# Patient Record
Sex: MALE | Race: WHITE | Employment: OTHER | URBAN - METROPOLITAN AREA
[De-identification: names, ages, dates, MRNs, and addresses within clinical notes are randomized per-mention and may not be internally consistent; named-entity substitution may affect disease eponyms.]

---

## 2018-01-10 ENCOUNTER — ANESTHESIA EVENT (OUTPATIENT)
Dept: ENDOSCOPY | Age: 63
DRG: 417 | End: 2018-01-10
Payer: COMMERCIAL

## 2018-01-10 ENCOUNTER — APPOINTMENT (OUTPATIENT)
Dept: GENERAL RADIOLOGY | Age: 63
DRG: 417 | End: 2018-01-10
Attending: SPECIALIST
Payer: COMMERCIAL

## 2018-01-10 ENCOUNTER — APPOINTMENT (OUTPATIENT)
Dept: CT IMAGING | Age: 63
DRG: 417 | End: 2018-01-10
Attending: SPECIALIST
Payer: COMMERCIAL

## 2018-01-10 ENCOUNTER — APPOINTMENT (OUTPATIENT)
Dept: ULTRASOUND IMAGING | Age: 63
DRG: 417 | End: 2018-01-10
Attending: EMERGENCY MEDICINE
Payer: COMMERCIAL

## 2018-01-10 ENCOUNTER — ANESTHESIA (OUTPATIENT)
Dept: ENDOSCOPY | Age: 63
DRG: 417 | End: 2018-01-10
Payer: COMMERCIAL

## 2018-01-10 ENCOUNTER — HOSPITAL ENCOUNTER (INPATIENT)
Age: 63
LOS: 5 days | Discharge: HOME | DRG: 417 | End: 2018-01-15
Attending: EMERGENCY MEDICINE | Admitting: INTERNAL MEDICINE
Payer: COMMERCIAL

## 2018-01-10 DIAGNOSIS — R17 ELEVATED BILIRUBIN: ICD-10-CM

## 2018-01-10 DIAGNOSIS — K80.43 CHOLEDOCHOLITHIASIS WITH ACUTE CHOLECYSTITIS WITH OBSTRUCTION: ICD-10-CM

## 2018-01-10 DIAGNOSIS — R10.11 RUQ PAIN: Primary | ICD-10-CM

## 2018-01-10 DIAGNOSIS — R79.89 ELEVATED LFTS: ICD-10-CM

## 2018-01-10 PROBLEM — K21.9 GERD (GASTROESOPHAGEAL REFLUX DISEASE): Status: ACTIVE | Noted: 2018-01-10

## 2018-01-10 PROBLEM — Z78.9 ALCOHOL USE: Status: ACTIVE | Noted: 2018-01-10

## 2018-01-10 PROBLEM — E78.5 HYPERLIPIDEMIA: Status: ACTIVE | Noted: 2018-01-10

## 2018-01-10 LAB
ALBUMIN SERPL-MCNC: 4.3 G/DL (ref 3.5–5)
ALBUMIN/GLOB SERPL: 1.5 {RATIO} (ref 1.1–2.2)
ALP SERPL-CCNC: 99 U/L (ref 45–117)
ALT SERPL-CCNC: 270 U/L (ref 12–78)
ANION GAP SERPL CALC-SCNC: 9 MMOL/L (ref 5–15)
APPEARANCE UR: CLEAR
APTT PPP: 24.5 SEC (ref 22.1–32.5)
AST SERPL-CCNC: 299 U/L (ref 15–37)
ATRIAL RATE: 63 BPM
BACTERIA URNS QL MICRO: NEGATIVE /HPF
BASOPHILS # BLD: 0 K/UL (ref 0–0.1)
BASOPHILS NFR BLD: 0 % (ref 0–1)
BILIRUB DIRECT SERPL-MCNC: 4.2 MG/DL (ref 0–0.2)
BILIRUB SERPL-MCNC: 4.6 MG/DL (ref 0.2–1)
BILIRUB UR QL CFM: POSITIVE
BUN SERPL-MCNC: 12 MG/DL (ref 6–20)
BUN/CREAT SERPL: 10 (ref 12–20)
CALCIUM SERPL-MCNC: 9 MG/DL (ref 8.5–10.1)
CALCULATED P AXIS, ECG09: 14 DEGREES
CALCULATED R AXIS, ECG10: 5 DEGREES
CALCULATED T AXIS, ECG11: 41 DEGREES
CHLORIDE SERPL-SCNC: 105 MMOL/L (ref 97–108)
CO2 SERPL-SCNC: 26 MMOL/L (ref 21–32)
COLOR UR: NORMAL
CREAT SERPL-MCNC: 1.2 MG/DL (ref 0.7–1.3)
DIAGNOSIS, 93000: NORMAL
DIFFERENTIAL METHOD BLD: ABNORMAL
EOSINOPHIL # BLD: 0.1 K/UL (ref 0–0.4)
EOSINOPHIL NFR BLD: 2 % (ref 0–7)
EPITH CASTS URNS QL MICRO: NORMAL /LPF
ERYTHROCYTE [DISTWIDTH] IN BLOOD BY AUTOMATED COUNT: 12.4 % (ref 11.5–14.5)
GLOBULIN SER CALC-MCNC: 2.8 G/DL (ref 2–4)
GLUCOSE SERPL-MCNC: 124 MG/DL (ref 65–100)
GLUCOSE UR STRIP.AUTO-MCNC: NEGATIVE MG/DL
HCT VFR BLD AUTO: 46.2 % (ref 36.6–50.3)
HGB BLD-MCNC: 16.4 G/DL (ref 12.1–17)
HGB UR QL STRIP: NEGATIVE
HYALINE CASTS URNS QL MICRO: NORMAL /LPF (ref 0–5)
INR PPP: 1.1 (ref 0.9–1.1)
KETONES UR QL STRIP.AUTO: NEGATIVE MG/DL
LEUKOCYTE ESTERASE UR QL STRIP.AUTO: NEGATIVE
LIPASE SERPL-CCNC: 182 U/L (ref 73–393)
LYMPHOCYTES # BLD: 0.6 K/UL (ref 0.8–3.5)
LYMPHOCYTES NFR BLD: 10 % (ref 12–49)
MCH RBC QN AUTO: 32.2 PG (ref 26–34)
MCHC RBC AUTO-ENTMCNC: 35.5 G/DL (ref 30–36.5)
MCV RBC AUTO: 90.8 FL (ref 80–99)
MONOCYTES # BLD: 0.6 K/UL (ref 0–1)
MONOCYTES NFR BLD: 9 % (ref 5–13)
NEUTS SEG # BLD: 4.9 K/UL (ref 1.8–8)
NEUTS SEG NFR BLD: 79 % (ref 32–75)
NITRITE UR QL STRIP.AUTO: NEGATIVE
P-R INTERVAL, ECG05: 164 MS
PH UR STRIP: 5.5 [PH] (ref 5–8)
PLATELET # BLD AUTO: 151 K/UL (ref 150–400)
POTASSIUM SERPL-SCNC: 4 MMOL/L (ref 3.5–5.1)
PROT SERPL-MCNC: 7.1 G/DL (ref 6.4–8.2)
PROT UR STRIP-MCNC: NEGATIVE MG/DL
PROTHROMBIN TIME: 10.6 SEC (ref 9–11.1)
Q-T INTERVAL, ECG07: 390 MS
QRS DURATION, ECG06: 98 MS
QTC CALCULATION (BEZET), ECG08: 399 MS
RBC # BLD AUTO: 5.09 M/UL (ref 4.1–5.7)
RBC #/AREA URNS HPF: NORMAL /HPF (ref 0–5)
RBC MORPH BLD: ABNORMAL
SODIUM SERPL-SCNC: 140 MMOL/L (ref 136–145)
SP GR UR REFRACTOMETRY: 1.02 (ref 1–1.03)
THERAPEUTIC RANGE,PTTT: NORMAL SECS (ref 58–77)
TROPONIN I SERPL-MCNC: <0.04 NG/ML
UA: UC IF INDICATED,UAUC: NORMAL
UROBILINOGEN UR QL STRIP.AUTO: 1 EU/DL (ref 0.2–1)
VENTRICULAR RATE, ECG03: 63 BPM
WBC # BLD AUTO: 6.2 K/UL (ref 4.1–11.1)
WBC MORPH BLD: ABNORMAL
WBC URNS QL MICRO: NORMAL /HPF (ref 0–4)

## 2018-01-10 PROCEDURE — 77030007288 HC DEV LOK BILI BSC -A: Performed by: SPECIALIST

## 2018-01-10 PROCEDURE — 74011250636 HC RX REV CODE- 250/636

## 2018-01-10 PROCEDURE — 0FC98ZZ EXTIRPATION OF MATTER FROM COMMON BILE DUCT, VIA NATURAL OR ARTIFICIAL OPENING ENDOSCOPIC: ICD-10-PCS | Performed by: SPECIALIST

## 2018-01-10 PROCEDURE — 74011000250 HC RX REV CODE- 250: Performed by: EMERGENCY MEDICINE

## 2018-01-10 PROCEDURE — 81001 URINALYSIS AUTO W/SCOPE: CPT | Performed by: EMERGENCY MEDICINE

## 2018-01-10 PROCEDURE — 96361 HYDRATE IV INFUSION ADD-ON: CPT

## 2018-01-10 PROCEDURE — 93005 ELECTROCARDIOGRAM TRACING: CPT

## 2018-01-10 PROCEDURE — 85025 COMPLETE CBC W/AUTO DIFF WBC: CPT | Performed by: EMERGENCY MEDICINE

## 2018-01-10 PROCEDURE — 77030032490 HC SLV COMPR SCD KNE COVD -B

## 2018-01-10 PROCEDURE — 74177 CT ABD & PELVIS W/CONTRAST: CPT

## 2018-01-10 PROCEDURE — 74011250636 HC RX REV CODE- 250/636: Performed by: SURGERY

## 2018-01-10 PROCEDURE — 74011250636 HC RX REV CODE- 250/636: Performed by: EMERGENCY MEDICINE

## 2018-01-10 PROCEDURE — 74011000258 HC RX REV CODE- 258: Performed by: SURGERY

## 2018-01-10 PROCEDURE — 76060000032 HC ANESTHESIA 0.5 TO 1 HR: Performed by: SPECIALIST

## 2018-01-10 PROCEDURE — 96376 TX/PRO/DX INJ SAME DRUG ADON: CPT

## 2018-01-10 PROCEDURE — 84484 ASSAY OF TROPONIN QUANT: CPT | Performed by: EMERGENCY MEDICINE

## 2018-01-10 PROCEDURE — 65270000029 HC RM PRIVATE

## 2018-01-10 PROCEDURE — 0F798ZZ DILATION OF COMMON BILE DUCT, VIA NATURAL OR ARTIFICIAL OPENING ENDOSCOPIC: ICD-10-PCS | Performed by: SPECIALIST

## 2018-01-10 PROCEDURE — 77030026438 HC STYL ET INTUB CARD -A: Performed by: ANESTHESIOLOGY

## 2018-01-10 PROCEDURE — 80074 ACUTE HEPATITIS PANEL: CPT | Performed by: EMERGENCY MEDICINE

## 2018-01-10 PROCEDURE — 77030008684 HC TU ET CUF COVD -B: Performed by: ANESTHESIOLOGY

## 2018-01-10 PROCEDURE — 77030010104 HC SEAL PRT ENDOSC BYRN -B: Performed by: SPECIALIST

## 2018-01-10 PROCEDURE — 96374 THER/PROPH/DIAG INJ IV PUSH: CPT

## 2018-01-10 PROCEDURE — 85730 THROMBOPLASTIN TIME PARTIAL: CPT | Performed by: EMERGENCY MEDICINE

## 2018-01-10 PROCEDURE — 74011250636 HC RX REV CODE- 250/636: Performed by: SPECIALIST

## 2018-01-10 PROCEDURE — 74011636320 HC RX REV CODE- 636/320: Performed by: SPECIALIST

## 2018-01-10 PROCEDURE — 74011636320 HC RX REV CODE- 636/320: Performed by: RADIOLOGY

## 2018-01-10 PROCEDURE — 85610 PROTHROMBIN TIME: CPT | Performed by: EMERGENCY MEDICINE

## 2018-01-10 PROCEDURE — 83690 ASSAY OF LIPASE: CPT | Performed by: EMERGENCY MEDICINE

## 2018-01-10 PROCEDURE — 82248 BILIRUBIN DIRECT: CPT | Performed by: SPECIALIST

## 2018-01-10 PROCEDURE — 74011250636 HC RX REV CODE- 250/636: Performed by: INTERNAL MEDICINE

## 2018-01-10 PROCEDURE — 77030011640 HC PAD GRND REM COVD -A: Performed by: SPECIALIST

## 2018-01-10 PROCEDURE — 96375 TX/PRO/DX INJ NEW DRUG ADDON: CPT

## 2018-01-10 PROCEDURE — 74011250637 HC RX REV CODE- 250/637: Performed by: INTERNAL MEDICINE

## 2018-01-10 PROCEDURE — 77030009038 HC CATH BILI STN RTVR BSC -C: Performed by: SPECIALIST

## 2018-01-10 PROCEDURE — 74329 X-RAY FOR PANCREAS ENDOSCOPY: CPT

## 2018-01-10 PROCEDURE — 80053 COMPREHEN METABOLIC PANEL: CPT | Performed by: EMERGENCY MEDICINE

## 2018-01-10 PROCEDURE — 77030012596 HC SPHNTOM BILI BSC -E: Performed by: SPECIALIST

## 2018-01-10 PROCEDURE — 74011000250 HC RX REV CODE- 250

## 2018-01-10 PROCEDURE — 76040000007: Performed by: SPECIALIST

## 2018-01-10 PROCEDURE — 36415 COLL VENOUS BLD VENIPUNCTURE: CPT | Performed by: SPECIALIST

## 2018-01-10 PROCEDURE — 76705 ECHO EXAM OF ABDOMEN: CPT

## 2018-01-10 PROCEDURE — 99284 EMERGENCY DEPT VISIT MOD MDM: CPT

## 2018-01-10 RX ORDER — INDOCYANINE GREEN AND WATER 25 MG
5 KIT INJECTION ONCE
Status: CANCELLED | OUTPATIENT
Start: 2018-01-11 | End: 2018-01-11

## 2018-01-10 RX ORDER — FLUMAZENIL 0.1 MG/ML
0.2 INJECTION INTRAVENOUS
Status: DISCONTINUED | OUTPATIENT
Start: 2018-01-10 | End: 2018-01-10 | Stop reason: HOSPADM

## 2018-01-10 RX ORDER — ONDANSETRON 2 MG/ML
8 INJECTION INTRAMUSCULAR; INTRAVENOUS
Status: COMPLETED | OUTPATIENT
Start: 2018-01-10 | End: 2018-01-10

## 2018-01-10 RX ORDER — ONDANSETRON 2 MG/ML
INJECTION INTRAMUSCULAR; INTRAVENOUS AS NEEDED
Status: DISCONTINUED | OUTPATIENT
Start: 2018-01-10 | End: 2018-01-10 | Stop reason: HOSPADM

## 2018-01-10 RX ORDER — FENTANYL CITRATE 50 UG/ML
25 INJECTION, SOLUTION INTRAMUSCULAR; INTRAVENOUS AS NEEDED
Status: DISCONTINUED | OUTPATIENT
Start: 2018-01-10 | End: 2018-01-10 | Stop reason: HOSPADM

## 2018-01-10 RX ORDER — NALOXONE HYDROCHLORIDE 0.4 MG/ML
0.4 INJECTION, SOLUTION INTRAMUSCULAR; INTRAVENOUS; SUBCUTANEOUS AS NEEDED
Status: DISCONTINUED | OUTPATIENT
Start: 2018-01-10 | End: 2018-01-15 | Stop reason: HOSPADM

## 2018-01-10 RX ORDER — HYDROMORPHONE HYDROCHLORIDE 2 MG/ML
0.5 INJECTION, SOLUTION INTRAMUSCULAR; INTRAVENOUS; SUBCUTANEOUS ONCE
Status: COMPLETED | OUTPATIENT
Start: 2018-01-10 | End: 2018-01-10

## 2018-01-10 RX ORDER — FENTANYL CITRATE 50 UG/ML
INJECTION, SOLUTION INTRAMUSCULAR; INTRAVENOUS AS NEEDED
Status: DISCONTINUED | OUTPATIENT
Start: 2018-01-10 | End: 2018-01-10 | Stop reason: HOSPADM

## 2018-01-10 RX ORDER — MIDAZOLAM HYDROCHLORIDE 1 MG/ML
.25-5 INJECTION, SOLUTION INTRAMUSCULAR; INTRAVENOUS AS NEEDED
Status: DISCONTINUED | OUTPATIENT
Start: 2018-01-10 | End: 2018-01-10 | Stop reason: HOSPADM

## 2018-01-10 RX ORDER — HYDROMORPHONE HYDROCHLORIDE 2 MG/ML
0.5 INJECTION, SOLUTION INTRAMUSCULAR; INTRAVENOUS; SUBCUTANEOUS
Status: COMPLETED | OUTPATIENT
Start: 2018-01-10 | End: 2018-01-10

## 2018-01-10 RX ORDER — HYDROMORPHONE HYDROCHLORIDE 2 MG/ML
0.5 INJECTION, SOLUTION INTRAMUSCULAR; INTRAVENOUS; SUBCUTANEOUS
Status: DISCONTINUED | OUTPATIENT
Start: 2018-01-10 | End: 2018-01-11

## 2018-01-10 RX ORDER — PROPOFOL 10 MG/ML
INJECTION, EMULSION INTRAVENOUS AS NEEDED
Status: DISCONTINUED | OUTPATIENT
Start: 2018-01-10 | End: 2018-01-10 | Stop reason: HOSPADM

## 2018-01-10 RX ORDER — MIDAZOLAM HYDROCHLORIDE 1 MG/ML
INJECTION, SOLUTION INTRAMUSCULAR; INTRAVENOUS AS NEEDED
Status: DISCONTINUED | OUTPATIENT
Start: 2018-01-10 | End: 2018-01-10 | Stop reason: HOSPADM

## 2018-01-10 RX ORDER — ROCURONIUM BROMIDE 10 MG/ML
INJECTION, SOLUTION INTRAVENOUS AS NEEDED
Status: DISCONTINUED | OUTPATIENT
Start: 2018-01-10 | End: 2018-01-10 | Stop reason: HOSPADM

## 2018-01-10 RX ORDER — ENOXAPARIN SODIUM 100 MG/ML
40 INJECTION SUBCUTANEOUS DAILY
Status: DISCONTINUED | OUTPATIENT
Start: 2018-01-10 | End: 2018-01-10

## 2018-01-10 RX ORDER — SODIUM CHLORIDE 9 MG/ML
50 INJECTION, SOLUTION INTRAVENOUS CONTINUOUS
Status: DISCONTINUED | OUTPATIENT
Start: 2018-01-10 | End: 2018-01-10

## 2018-01-10 RX ORDER — TADALAFIL 5 MG/1
5 TABLET ORAL DAILY
COMMUNITY

## 2018-01-10 RX ORDER — KETOROLAC TROMETHAMINE 30 MG/ML
15 INJECTION, SOLUTION INTRAMUSCULAR; INTRAVENOUS
Status: COMPLETED | OUTPATIENT
Start: 2018-01-10 | End: 2018-01-10

## 2018-01-10 RX ORDER — LIDOCAINE HYDROCHLORIDE 20 MG/ML
INJECTION, SOLUTION EPIDURAL; INFILTRATION; INTRACAUDAL; PERINEURAL AS NEEDED
Status: DISCONTINUED | OUTPATIENT
Start: 2018-01-10 | End: 2018-01-10 | Stop reason: HOSPADM

## 2018-01-10 RX ORDER — LORAZEPAM 1 MG/1
2 TABLET ORAL
Status: DISCONTINUED | OUTPATIENT
Start: 2018-01-10 | End: 2018-01-15 | Stop reason: HOSPADM

## 2018-01-10 RX ORDER — NALOXONE HYDROCHLORIDE 0.4 MG/ML
0.4 INJECTION, SOLUTION INTRAMUSCULAR; INTRAVENOUS; SUBCUTANEOUS
Status: DISCONTINUED | OUTPATIENT
Start: 2018-01-10 | End: 2018-01-10 | Stop reason: HOSPADM

## 2018-01-10 RX ORDER — DEXTROMETHORPHAN/PSEUDOEPHED 2.5-7.5/.8
1.2 DROPS ORAL
Status: DISCONTINUED | OUTPATIENT
Start: 2018-01-10 | End: 2018-01-10 | Stop reason: HOSPADM

## 2018-01-10 RX ORDER — ONDANSETRON 2 MG/ML
4 INJECTION INTRAMUSCULAR; INTRAVENOUS
Status: DISCONTINUED | OUTPATIENT
Start: 2018-01-10 | End: 2018-01-15 | Stop reason: HOSPADM

## 2018-01-10 RX ORDER — LORAZEPAM 1 MG/1
4 TABLET ORAL
Status: DISCONTINUED | OUTPATIENT
Start: 2018-01-10 | End: 2018-01-15 | Stop reason: HOSPADM

## 2018-01-10 RX ORDER — HYDROCODONE BITARTRATE AND ACETAMINOPHEN 5; 325 MG/1; MG/1
1 TABLET ORAL
Status: DISCONTINUED | OUTPATIENT
Start: 2018-01-10 | End: 2018-01-14

## 2018-01-10 RX ORDER — SODIUM CHLORIDE 0.9 % (FLUSH) 0.9 %
5-10 SYRINGE (ML) INJECTION AS NEEDED
Status: DISCONTINUED | OUTPATIENT
Start: 2018-01-10 | End: 2018-01-15 | Stop reason: HOSPADM

## 2018-01-10 RX ORDER — FAMOTIDINE 10 MG/ML
20 INJECTION INTRAVENOUS
Status: COMPLETED | OUTPATIENT
Start: 2018-01-10 | End: 2018-01-10

## 2018-01-10 RX ORDER — SODIUM CHLORIDE 0.9 % (FLUSH) 0.9 %
5-10 SYRINGE (ML) INJECTION EVERY 8 HOURS
Status: DISCONTINUED | OUTPATIENT
Start: 2018-01-10 | End: 2018-01-15 | Stop reason: HOSPADM

## 2018-01-10 RX ORDER — SODIUM CHLORIDE 9 MG/ML
125 INJECTION, SOLUTION INTRAVENOUS CONTINUOUS
Status: DISPENSED | OUTPATIENT
Start: 2018-01-10 | End: 2018-01-11

## 2018-01-10 RX ORDER — HYDROMORPHONE HYDROCHLORIDE 2 MG/ML
0.5 INJECTION, SOLUTION INTRAMUSCULAR; INTRAVENOUS; SUBCUTANEOUS
Status: DISCONTINUED | OUTPATIENT
Start: 2018-01-10 | End: 2018-01-10

## 2018-01-10 RX ORDER — SUCCINYLCHOLINE CHLORIDE 20 MG/ML
INJECTION INTRAMUSCULAR; INTRAVENOUS AS NEEDED
Status: DISCONTINUED | OUTPATIENT
Start: 2018-01-10 | End: 2018-01-10 | Stop reason: HOSPADM

## 2018-01-10 RX ORDER — OMEPRAZOLE 20 MG/1
20 CAPSULE, DELAYED RELEASE ORAL DAILY
COMMUNITY

## 2018-01-10 RX ORDER — SODIUM CHLORIDE, SODIUM LACTATE, POTASSIUM CHLORIDE, CALCIUM CHLORIDE 600; 310; 30; 20 MG/100ML; MG/100ML; MG/100ML; MG/100ML
50 INJECTION, SOLUTION INTRAVENOUS CONTINUOUS
Status: DISCONTINUED | OUTPATIENT
Start: 2018-01-10 | End: 2018-01-10

## 2018-01-10 RX ADMIN — HYDROCODONE BITARTRATE AND ACETAMINOPHEN 1 TABLET: 5; 325 TABLET ORAL at 17:50

## 2018-01-10 RX ADMIN — FENTANYL CITRATE 25 MCG: 50 INJECTION, SOLUTION INTRAMUSCULAR; INTRAVENOUS at 14:09

## 2018-01-10 RX ADMIN — PROPOFOL 200 MG: 10 INJECTION, EMULSION INTRAVENOUS at 13:54

## 2018-01-10 RX ADMIN — ONDANSETRON 4 MG: 2 INJECTION INTRAMUSCULAR; INTRAVENOUS at 14:13

## 2018-01-10 RX ADMIN — FENTANYL CITRATE 50 MCG: 50 INJECTION, SOLUTION INTRAMUSCULAR; INTRAVENOUS at 13:54

## 2018-01-10 RX ADMIN — LORAZEPAM 2 MG: 1 TABLET ORAL at 22:07

## 2018-01-10 RX ADMIN — PIPERACILLIN SODIUM AND TAZOBACTAM SODIUM 3.38 G: 3; .375 INJECTION, POWDER, LYOPHILIZED, FOR SOLUTION INTRAVENOUS at 12:41

## 2018-01-10 RX ADMIN — ROCURONIUM BROMIDE 5 MG: 10 INJECTION, SOLUTION INTRAVENOUS at 13:54

## 2018-01-10 RX ADMIN — IOPAMIDOL 10 ML: 612 INJECTION, SOLUTION INTRAVENOUS at 14:27

## 2018-01-10 RX ADMIN — PIPERACILLIN SODIUM AND TAZOBACTAM SODIUM 3.38 G: 3; .375 INJECTION, POWDER, LYOPHILIZED, FOR SOLUTION INTRAVENOUS at 20:34

## 2018-01-10 RX ADMIN — MIDAZOLAM HYDROCHLORIDE 2 MG: 1 INJECTION, SOLUTION INTRAMUSCULAR; INTRAVENOUS at 13:51

## 2018-01-10 RX ADMIN — LIDOCAINE HYDROCHLORIDE 40 MG: 20 INJECTION, SOLUTION EPIDURAL; INFILTRATION; INTRACAUDAL; PERINEURAL at 13:54

## 2018-01-10 RX ADMIN — HYDROMORPHONE HYDROCHLORIDE 0.5 MG: 2 INJECTION INTRAMUSCULAR; INTRAVENOUS; SUBCUTANEOUS at 06:18

## 2018-01-10 RX ADMIN — KETOROLAC TROMETHAMINE 15 MG: 30 INJECTION, SOLUTION INTRAMUSCULAR at 06:17

## 2018-01-10 RX ADMIN — HYDROMORPHONE HYDROCHLORIDE 0.5 MG: 2 INJECTION INTRAMUSCULAR; INTRAVENOUS; SUBCUTANEOUS at 21:54

## 2018-01-10 RX ADMIN — HYDROMORPHONE HYDROCHLORIDE 0.5 MG: 2 INJECTION INTRAMUSCULAR; INTRAVENOUS; SUBCUTANEOUS at 09:28

## 2018-01-10 RX ADMIN — SODIUM CHLORIDE 75 ML/HR: 900 INJECTION, SOLUTION INTRAVENOUS at 17:51

## 2018-01-10 RX ADMIN — FAMOTIDINE 20 MG: 10 INJECTION, SOLUTION INTRAVENOUS at 06:21

## 2018-01-10 RX ADMIN — IOPAMIDOL 96 ML: 755 INJECTION, SOLUTION INTRAVENOUS at 10:25

## 2018-01-10 RX ADMIN — LORAZEPAM 2 MG: 1 TABLET ORAL at 23:19

## 2018-01-10 RX ADMIN — HYDROMORPHONE HYDROCHLORIDE 0.5 MG: 2 INJECTION INTRAMUSCULAR; INTRAVENOUS; SUBCUTANEOUS at 07:47

## 2018-01-10 RX ADMIN — ONDANSETRON 4 MG: 2 INJECTION INTRAMUSCULAR; INTRAVENOUS at 20:31

## 2018-01-10 RX ADMIN — FENTANYL CITRATE 25 MCG: 50 INJECTION, SOLUTION INTRAMUSCULAR; INTRAVENOUS at 14:15

## 2018-01-10 RX ADMIN — DIATRIZOATE MEGLUMINE AND DIATRIZOATE SODIUM 30 ML: 660; 100 LIQUID ORAL; RECTAL at 08:50

## 2018-01-10 RX ADMIN — SUCCINYLCHOLINE CHLORIDE 100 MG: 20 INJECTION INTRAMUSCULAR; INTRAVENOUS at 13:54

## 2018-01-10 RX ADMIN — SODIUM CHLORIDE, POTASSIUM CHLORIDE, SODIUM LACTATE AND CALCIUM CHLORIDE: 600; 310; 30; 20 INJECTION, SOLUTION INTRAVENOUS at 13:22

## 2018-01-10 RX ADMIN — SODIUM CHLORIDE 1000 ML: 900 INJECTION, SOLUTION INTRAVENOUS at 06:31

## 2018-01-10 RX ADMIN — ONDANSETRON 8 MG: 2 INJECTION INTRAMUSCULAR; INTRAVENOUS at 06:13

## 2018-01-10 RX ADMIN — HYDROMORPHONE HYDROCHLORIDE 0.5 MG: 2 INJECTION INTRAMUSCULAR; INTRAVENOUS; SUBCUTANEOUS at 12:40

## 2018-01-10 RX ADMIN — HYDROMORPHONE HYDROCHLORIDE 0.5 MG: 2 INJECTION INTRAMUSCULAR; INTRAVENOUS; SUBCUTANEOUS at 18:48

## 2018-01-10 NOTE — PROCEDURES
[address] D. Glorine Councilman, MD  [phone]      January 10, 2018    Endoscopic Retrograde CholangioPancreatography (ERCP) Procedure Note  Lexi Yoon  : 1955  [facility] Medical Record Number: [id number]      Indications:  Abdominal pain and imaging shows choledocholithiasis. PCP:  Vanda Lewis MD  Anesthesia/Sedation: General endotracheal anesthesia  Endoscopist:  Dr. Sherman Maloney  Complications:  None  Estimated Blood Loss:  None     Permit:  The indications, risks, benefits and alternatives were reviewed with the patient or their decision maker who was provided an opportunity to ask questions and all questions were answered. The specific risks of endoscopic retrograde cholangiopancreatography with conscious sedation were reviewed, including but not limited to anesthetic complication, bleeding, adverse drug reaction, missed lesion, infection, IV site reactions, intestinal perforation which would lead to the need for surgical repair, failed cannulation, and post-ERCP pancreatitis. Specific mention was made of the incidence of post-ERCP pancreatitis, estimated at 5% or 1 out of 20. The patient was advised that although most who develop post-ERCP pancreatitis have mild interstitial pancreatitis, some patients can develop severe necrotizing pancreatitis which could lead to the need for prolonged hospitalization, the need for surgery, the need for antibiotics, the need for blood products, or the potential for subsequent sepsis, multiorgan failure, or even death. The alternatives to ERCP including observation without testing, magnetic resonance cholangiopancreatography, surgery, or percutaneous cholangiopancreatography were reviewed as well as the benefits and limitations of each of the above alternatives.   After considering the options and having all their questions answered, the patient or their decision maker provided both verbal and written consent to proceed. Procedure in Detail:  After obtaining informed consent, positioning of the patient prone, and conduction of a pre-procedure pause or \"time out\" the endoscope was introduced into the mouth and advanced to the duodenum to visualize and instrument the ampullary opening. We cannulated the CBD easily. Cholangiography shows small filling defects in distal CBD. The cystic duct fills and the gallbladder accepts contrast.  No leak. We performed endoscopic sphincterotomy in good direction with complete transection. Immediate hemostasis noted. We swapped out for the balloon extraction device. With first sweep we removed debris and 2 small stones and stone fragments. Sweep number 2 with just a small amount of sludge. Then we got dry sweep x2 and a completion cholangiogram revealed no persistent filling defects so the procedure was considered complete. Specimens: none           Recommendations: Choledocholithiasis with endoscopic removal after sphincterotomy. If pain free at 1600 he can have clears, NPO after midnight for gallbladder surgery tomorrow. Thank you for entrusting me with this patient's care. Please do not hesitate to contact me with any questions or if I can be of assistance with any of your other patients' GI needs.   Signed By: Mary Correa MD                        January 10, 2018

## 2018-01-10 NOTE — IP AVS SNAPSHOT
[address] 
[phone] Patient: Bud Polk MRN: [id number] :1955 A check terri indicates which time of day the medication should be taken. My Medications START taking these medications Instructions Each Dose to Equal  
 Morning Noon Evening Bedtime HYDROcodone-acetaminophen 5-325 mg per tablet Commonly known as:  Marva Oh Your last dose was: Your next dose is:    
   
   
 1 to 2 tablets every 4 hours as needed for pain CONTINUE taking these medications Instructions Each Dose to Equal  
 Morning Noon Evening Bedtime  
 aspirin 81 mg chewable tablet Your last dose was: Your next dose is: Take 81 mg by mouth nightly. 81 mg  
    
   
   
   
  
 CIALIS 5 mg tablet Generic drug:  tadalafil Your last dose was: Your next dose is: Take 5 mg by mouth daily. 5 mg  
    
   
   
   
  
 fenofibrate nanocrystallized 145 mg tablet Commonly known as:  [employer] Your last dose was: Your next dose is: Take 145 mg by mouth nightly. Indications: hypertriglyceridemia 145 mg FISH -1,000 mg capsule Generic drug:  fish oil-omega-3 fatty acids Your last dose was: Your next dose is: Take 1 Cap by mouth two (2) times a day. 1 Cap  
    
   
   
   
  
 omeprazole 20 mg capsule Commonly known as:  PRILOSEC Your last dose was: Your next dose is: Take 20 mg by mouth daily. 20 mg  
    
   
   
   
  
 OTHER(NON-FORMULARY) Your last dose was: Your next dose is: BERNA Root : 2 daily ZANTAC 150 mg tablet Generic drug:  raNITIdine Your last dose was: Your next dose is: Take 150 mg by mouth every evening.   
 150 mg  
 Where to Get Your Medications Information on where to get these meds will be given to you by the nurse or doctor. ! Ask your nurse or doctor about these medications HYDROcodone-acetaminophen 5-325 mg per tablet

## 2018-01-10 NOTE — ED TRIAGE NOTES
Complains of sharp stabbing pain in his epigastric area onset 1300 without relief from the over counter meds for antacid, gas ex, ibuprofen. Denies shortness of breath and no nausea.  Pain increases with respiration

## 2018-01-10 NOTE — CONSULTS
Surgery Consult    Subjective:      Johanna Rizzo is a 63 y.o. white male who presents with epigastric and RUQ abdominal pain. Over the past 2 to 3 weeks, Mr. Army Salvador has had a few bouts of sharp, stabbing epigastric and RUQ pain which occurs spontaneously. The pain is not exacerbated by eating and has resolved without treatment. Yesterday, the pain recurred, but did not go away. He has tried belching and eating without relief. He denies any n/v, fever, diarrhea, or jaundice. His urine is much darker, but his stool is still normal color. He has no h/o liver disease, gallstones, pancreatitis, PUD, IBD, or IBS. He takes medication for GERD. His only abdominal procedures are an open appy and lap RIH. He was found to have a total bilirubin of 4.6 in the ER, but his UD was negative. His CT reveals gallstones and CBD stones. He feels better after pain medication, but still has mild discomfort. Past Medical History:   Diagnosis Date    GERD (gastroesophageal reflux disease)      Past Surgical History:   Procedure Laterality Date    HX APPENDECTOMY  1997    HX ORTHOPAEDIC  2013    right shoulder    HX OTHER SURGICAL  2016    Inguinal hernia repair      History reviewed. No pertinent family history.   Social History     Social History    Marital status:      Spouse name: N/A    Number of children: N/A    Years of education: N/A     Social History Main Topics    Smoking status: Never Smoker    Smokeless tobacco: Never Used    Alcohol use Yes      Comment: 3 bottles of beer everyday    Drug use: No    Sexual activity: Not Asked     Other Topics Concern    None     Social History Narrative      Current Facility-Administered Medications   Medication Dose Route Frequency Provider Last Rate Last Dose    sodium chloride (NS) flush 5-10 mL  5-10 mL IntraVENous Q8H Tee Berger MD        sodium chloride (NS) flush 5-10 mL  5-10 mL IntraVENous PRN MD Radha Yo HYDROmorphone (DILAUDID) injection 0.5 mg  0.5 mg IntraVENous Q4H PRN Scott Walton MD        naloxone [facility]) injection 0.4 mg  0.4 mg IntraVENous PRN Scott Walton MD        ondansetron [facility]) injection 4 mg  4 mg IntraVENous Q4H PRN Scott Walton MD         Current Outpatient Prescriptions   Medication Sig Dispense Refill    tadalafil (CIALIS) 5 mg tablet Take 5 mg by mouth.  DOCOSAHEXANOIC ACID/EPA (FISH OIL PO) Take  by mouth.  OTHER Indications: Macaroot      aspirin 81 mg chewable tablet Take 81 mg by mouth daily.  omeprazole (PRILOSEC) 10 mg capsule Take 20 mg by mouth daily.  ranitidine (ZANTAC) 150 mg tablet Take 150 mg by mouth two (2) times a day.  fenofibrate nanocrystallized (TRICOR) 145 mg tablet Take 145 mg by mouth daily. Indications: HYPERTRIGLYCERIDEMIA          No Known Allergies    Review of Systems:  A comprehensive review of systems was negative except for that written in the History of Present Illness. Objective:      Patient Vitals for the past 8 hrs:   BP Temp Pulse Resp SpO2 Height Weight   01/10/18 0925 133/83 - 97 16 97 % - -   01/10/18 0746 122/87 - 69 - 98 % - -   01/10/18 0442 133/81 97.7 °F (36.5 °C) 66 16 98 % 5' 11\" (1.803 m) 190 lb (86.2 kg)       Temp (24hrs), Av.7 °F (36.5 °C), Min:97.7 °F (36.5 °C), Max:97.7 °F (36.5 °C)      Physical Exam:  GENERAL: alert, cooperative, no distress, appears stated age, EYE: positive findings: sclerae are icteric., LYMPHATIC: Cervical, supraclavicular nodes normal. , THROAT & NECK: normal, LUNG: clear to auscultation bilaterally, HEART: regular rate and rhythm, ABDOMEN: Soft, ND, minimal epigastric tenderness without guarding. There are no masses.   There is a healed RLQ surgical scar., EXTREMITIES:  no edema, SKIN: Normal., NEUROLOGIC: negative, PSYCHIATRIC: non focal    Assessment:     Hospital Problems  Date Reviewed: 2013          Codes Class Noted POA    Elevated LFTs ICD-10-CM: R79.89  ICD-9-CM: 790.6  1/10/2018 Yes        * (Principal)Choledocholithiasis with acute cholecystitis with obstruction ICD-10-CM: K80.41  ICD-9-CM: 574.31  1/10/2018 Yes              Plan:     Mr. Feli Luciano is scheduled for an ERCP with Dr. Chas Henriquez this afternoon. He will need his GB removed as definitive treatment and has been scheduled for a robotic-assisted laparoscopic cholecystectomy with firefly tomorrow. I discussed the risks of the procedure including bleeding, infection, wound healing problems, blood clots, injury to the bowel, bile duct, and liver, and reaction to the prep, contrast, and local or general anesthetic. He and his wife understand the risks; any and all questions were answered to their satisfaction. I appreciate the opportunity to participate in Mr. Feli Luciano' care.     Signed By: Archie Duncan MD     January 10, 2018

## 2018-01-10 NOTE — CONSULTS
Gastroenterology Consultation Note      Admit Date: 1/10/2018  Consult Date: 1/10/2018   I greatly appreciate your asking me to see Flavio Elliott, thank you very much for the opportunity to participate in his care. Narrative Assessment and Plan   · Acute cholecystitis  · Choledocholithiasis  · Hyperbilirubinemia secondary to above  · Elevated liver enzymes    Plan  - ERCP scheduled for 2pm today. I have discussed possible ERCP, sphincterotomy, stone extraction, stent placement biopsy, alternatives, potential complications including but not limited to pancreatitis, bleeding, perforation requiring operative repair and/or blood transfusions. All questions answered and he is agreeable to proceed. - surgery has already been consulted as patient will need cholecystectomy  - repeat labs in AM    ---    Ernestina Dukes. Neelam Wick MD  [phone] office  [phone] voicemail   I have personally reviewed the history and independently examined the patient. I have reviewed the chart and agree with the documentation recorded by the Mid Level Provider, including the assessment, treatment plan, and disposition. ASSESSMENT AND PLAN:  3 weeks of intermittent symptoms  Biliary colic. Initial ultrasound negative but the CT told the tale. We've discussed indications, risks, benefits and alternatives to ERCP and he's asked we proceed. Anuj Lincoln MD          Subjective:     Chief Complaint: epigastric pain    History of Present Illness: Flavio Elliott is a 63 y.o. male who presents today with complaints of constant, knife stabbing pain in epigastric region that radiates to RUQ. Sudden onset around 1pm yesterday. Had two prior episodes in the past few weeks but nothing this severe. Denies associated fever, chills, nausea, vomiting, or diarrhea. Has a history of GERD and in past has required multiple esophageal dilations. On omeprazole daily in AM and zantac daily at bedtime.  He tried taking his antacid medication but no relief. No relief with ibuprofen. Able to tolerate meals but this caused bloating. Admits to drinking about 3 beers daily. He came to ED for evaluation. In ED: Tbili 4.6, , , alk phos 99, lipase 182. Ultrasound showed hepatic steatosis and gallbladder polyp, normal CBD. CT scan reveals acute cholecystitis and choledocholithiasis. PCP:  Farhan Simmons MD    Past Medical History:   Diagnosis Date    GERD (gastroesophageal reflux disease)         Past Surgical History:   Procedure Laterality Date    HX APPENDECTOMY  1997    HX ORTHOPAEDIC  2013    right shoulder    HX OTHER SURGICAL  2016    Inguinal hernia repair       Social History   Substance Use Topics    Smoking status: Never Smoker    Smokeless tobacco: Never Used    Alcohol use Yes      Comment: 3 bottles of beer everyday        History reviewed. No pertinent family history. No Known Allergies         Home Medications:  Prior to Admission Medications   Prescriptions Last Dose Informant Patient Reported? Taking? DOCOSAHEXANOIC ACID/EPA (FISH OIL PO) 1/9/2018 at Unknown time  Yes Yes   Sig: Take  by mouth. OTHER 1/9/2018 at Unknown time  Yes Yes   Sig: Indications: Macaroot   aspirin 81 mg chewable tablet 1/9/2018 at Unknown time  Yes Yes   Sig: Take 81 mg by mouth daily. fenofibrate nanocrystallized (TRICOR) 145 mg tablet 1/9/2018 at Unknown time  Yes Yes   Sig: Take 145 mg by mouth daily. Indications: HYPERTRIGLYCERIDEMIA   omeprazole (PRILOSEC) 10 mg capsule 1/9/2018 at Unknown time  Yes Yes   Sig: Take 20 mg by mouth daily. ranitidine (ZANTAC) 150 mg tablet 1/9/2018 at Unknown time  Yes Yes   Sig: Take 150 mg by mouth two (2) times a day. tadalafil (CIALIS) 5 mg tablet 1/9/2018 at Unknown time  Yes Yes   Sig: Take 5 mg by mouth.       Facility-Administered Medications: None       Hospital Medications:  Current Facility-Administered Medications   Medication Dose Route Frequency    sodium chloride (NS) flush 5-10 mL  5-10 mL IntraVENous Q8H    sodium chloride (NS) flush 5-10 mL  5-10 mL IntraVENous PRN    HYDROmorphone (DILAUDID) injection 0.5 mg  0.5 mg IntraVENous Q4H PRN    naloxone (NARCAN) injection 0.4 mg  0.4 mg IntraVENous PRN    ondansetron (ZOFRAN) injection 4 mg  4 mg IntraVENous Q4H PRN     Current Outpatient Prescriptions   Medication Sig    tadalafil (CIALIS) 5 mg tablet Take 5 mg by mouth.  DOCOSAHEXANOIC ACID/EPA (FISH OIL PO) Take  by mouth.  OTHER Indications: Macaroot    aspirin 81 mg chewable tablet Take 81 mg by mouth daily.  omeprazole (PRILOSEC) 10 mg capsule Take 20 mg by mouth daily.  ranitidine (ZANTAC) 150 mg tablet Take 150 mg by mouth two (2) times a day.  fenofibrate nanocrystallized (TRICOR) 145 mg tablet Take 145 mg by mouth daily. Indications: HYPERTRIGLYCERIDEMIA       Review of Systems: Admission ROS by Tee Berger MD from 1/10/2018 were reviewed with the patient and changes (other than per HPI) include: none      Objective:     Physical Exam:  Visit Vitals    /83 (BP 1 Location: Right arm, BP Patient Position: Sitting)    Pulse 97    Temp 97.7 °F (36.5 °C)    Resp 16    Ht 5' 11\" (1.803 m)    Wt 86.2 kg (190 lb)    SpO2 97%    BMI 26.5 kg/m2     SpO2 Readings from Last 6 Encounters:   01/10/18 97%   10/05/14 94%   06/01/13 97%   05/31/13 99%        No intake or output data in the 24 hours ending 01/10/18 1147   General: no distress, comfortable  Skin:  Jaundice  HEENT: Pupils equal, sclera icterus, oropharynx with no gross lesions  Cardiovascular: regular rate and rhythm, well perfused, no edema  Respiratory:  No abnormal audible breath sounds, normal respiratory effort, no throacic deformity  GI:  Bowel sounds present, soft, nondistended, tender epigastrium and RUQ. Musculoskeletal:  No skeletal deformity nor acute arthritis noted.   Neurological:  Motor and sensory function intact in upper extremeties  Psychiatric:  Normal affect, memory intact, appears to have insight into current illness  Lymphatic:  No cervical or periumbilic lymphadenopathy    Laboratory:    Recent Results (from the past 24 hour(s))   CBC WITH AUTOMATED DIFF    Collection Time: 01/10/18  4:57 AM   Result Value Ref Range    WBC 6.2 4.1 - 11.1 K/uL    RBC 5.09 4. 10 - 5.70 M/uL    HGB 16.4 12.1 - 17.0 g/dL    HCT 46.2 36.6 - 50.3 %    MCV 90.8 80.0 - 99.0 FL    MCH 32.2 26.0 - 34.0 PG    MCHC 35.5 30.0 - 36.5 g/dL    RDW 12.4 11.5 - 14.5 %    PLATELET 845 980 - 777 K/uL    NEUTROPHILS 79 (H) 32 - 75 %    LYMPHOCYTES 10 (L) 12 - 49 %    MONOCYTES 9 5 - 13 %    EOSINOPHILS 2 0 - 7 %    BASOPHILS 0 0 - 1 %    ABS. NEUTROPHILS 4.9 1.8 - 8.0 K/UL    ABS. LYMPHOCYTES 0.6 (L) 0.8 - 3.5 K/UL    ABS. MONOCYTES 0.6 0.0 - 1.0 K/UL    ABS. EOSINOPHILS 0.1 0.0 - 0.4 K/UL    ABS. BASOPHILS 0.0 0.0 - 0.1 K/UL    RBC COMMENTS NORMOCYTIC, NORMOCHROMIC      WBC COMMENTS TOXIC GRANULATION      DF SMEAR SCANNED     METABOLIC PANEL, COMPREHENSIVE    Collection Time: 01/10/18  4:57 AM   Result Value Ref Range    Sodium 140 136 - 145 mmol/L    Potassium 4.0 3.5 - 5.1 mmol/L    Chloride 105 97 - 108 mmol/L    CO2 26 21 - 32 mmol/L    Anion gap 9 5 - 15 mmol/L    Glucose 124 (H) 65 - 100 mg/dL    BUN 12 6 - 20 MG/DL    Creatinine 1.20 0.70 - 1.30 MG/DL    BUN/Creatinine ratio 10 (L) 12 - 20      GFR est AA >60 >60 ml/min/1.73m2    GFR est non-AA >60 >60 ml/min/1.73m2    Calcium 9.0 8.5 - 10.1 MG/DL    Bilirubin, total 4.6 (H) 0.2 - 1.0 MG/DL    ALT (SGPT) 270 (H) 12 - 78 U/L    AST (SGOT) 299 (H) 15 - 37 U/L    Alk.  phosphatase 99 45 - 117 U/L    Protein, total 7.1 6.4 - 8.2 g/dL    Albumin 4.3 3.5 - 5.0 g/dL    Globulin 2.8 2.0 - 4.0 g/dL    A-G Ratio 1.5 1.1 - 2.2     LIPASE    Collection Time: 01/10/18  4:57 AM   Result Value Ref Range    Lipase 182 73 - 393 U/L   TROPONIN I    Collection Time: 01/10/18  4:57 AM   Result Value Ref Range    Troponin-I, Qt. <0.04 <0.05 ng/mL   URINALYSIS W/ REFLEX CULTURE    Collection Time: 01/10/18  4:57 AM   Result Value Ref Range    Color DARK YELLOW      Appearance CLEAR CLEAR      Specific gravity 1.020 1.003 - 1.030      pH (UA) 5.5 5.0 - 8.0      Protein NEGATIVE  NEG mg/dL    Glucose NEGATIVE  NEG mg/dL    Ketone NEGATIVE  NEG mg/dL    Blood NEGATIVE  NEG      Urobilinogen 1.0 0.2 - 1.0 EU/dL    Nitrites NEGATIVE  NEG      Leukocyte Esterase NEGATIVE  NEG      WBC 0-4 0 - 4 /hpf    RBC 0-5 0 - 5 /hpf    Epithelial cells FEW FEW /lpf    Bacteria NEGATIVE  NEG /hpf    UA:UC IF INDICATED CULTURE NOT INDICATED BY UA RESULT CNI      Hyaline cast 0-2 0 - 5 /lpf   BILIRUBIN, CONFIRM    Collection Time: 01/10/18  4:57 AM   Result Value Ref Range    Bilirubin UA, confirm POSITIVE (A) NEG     BILIRUBIN, DIRECT    Collection Time: 01/10/18  9:25 AM   Result Value Ref Range    Bilirubin, direct 4.2 (H) 0.0 - 0.2 MG/DL         Assessment/Plan:     Principal Problem:    Elevated LFTs (1/10/2018)         See above narrative for full detail.     Bradley Wilson PA-C  01/10/18  11:47 AM

## 2018-01-10 NOTE — PERIOP NOTES
Received sbar from Zuly Adhikari RN. Compression sequential device and sleeves applied per physician order. Patient has been educated and procedure has been explained. Sleeves applied to Haylie ESPINOZA

## 2018-01-10 NOTE — ANESTHESIA POSTPROCEDURE EVALUATION
Post-Anesthesia Evaluation and Assessment    Patient: Mina Kate MRN: [id number]  SSN: [id number]    YOB: 1955  Age: 63 y.o. Sex: male       Cardiovascular Function/Vital Signs  Visit Vitals    BP (!) 147/97 (BP 1 Location: Right arm, BP Patient Position: Sitting)    Pulse 75    Temp 36.5 °C (97.7 °F)    Resp 15    Ht 5' 11\" (1.803 m)    Wt 86.2 kg (190 lb)    SpO2 98%    BMI 26.5 kg/m2       Patient is status post general anesthesia for Procedure(s):  ENDOSCOPIC RETROGRADE CHOLANGIOPANCREATOGRAPHY with fluoro   ENDOSCOPIC STONE EXTRACTION/BALLOON SWEEP  SPHINCTEROTOMY. Nausea/Vomiting: None    Postoperative hydration reviewed and adequate. Pain:  Pain Scale 1: Numeric (0 - 10) (01/10/18 5228)  Pain Intensity 1: 0 (01/10/18 8787)   Managed    Neurological Status: At baseline    Mental Status and Level of Consciousness: Arousable    Pulmonary Status:   O2 Device: Room air (01/10/18 9668)   Adequate oxygenation and airway patent    Complications related to anesthesia: None    Post-anesthesia assessment completed.  No concerns    Signed By: Kaylene Shepard MD     January 10, 2018

## 2018-01-10 NOTE — PERIOP NOTES
SCD's disconnected from machine, to be reconnected in ED. No change in lower extremities. Dr. Lidia Flores saw patient, said ok to discharge back to ED.

## 2018-01-10 NOTE — ED PROVIDER NOTES
Patient is a 63 y.o. male presenting with abdominal pain. Abdominal Pain    Pertinent negatives include no fever, no diarrhea, no nausea, no vomiting, no constipation, no dysuria, no headaches and no chest pain. 63year old male with GERD/esophageal dilatation, triglycerides, presents with RUQ/epigastric pain since 1pm yesterday. Constant. Ate egg and ham sandwich at 9am. No nausea/vomiting or fever. Normal bowels and urine. Has had 2 similar episodes in the past few weeks that resolved after a few hours. This time it wont stop. Does drink 3 bud lights every night, sometimes more if friends over. Still has his gallbladder. Tried gaviscon without relief. Takes GERD medications faithfully. Pain is 7/10. Past Medical History:   Diagnosis Date    GERD (gastroesophageal reflux disease)        No past surgical history on file. No family history on file. Social History     Social History    Marital status:      Spouse name: N/A    Number of children: N/A    Years of education: N/A     Occupational History    Not on file. Social History Main Topics    Smoking status: Not on file    Smokeless tobacco: Not on file    Alcohol use Yes      Comment: 3 bottles of beer everyday    Drug use: No    Sexual activity: Not on file     Other Topics Concern    Not on file     Social History Narrative    No narrative on file         ALLERGIES: Review of patient's allergies indicates no known allergies. Review of Systems   Constitutional: Negative for fever. HENT: Negative for congestion. Eyes: Negative for visual disturbance. Respiratory: Negative for cough and shortness of breath. Cardiovascular: Negative for chest pain. Gastrointestinal: Positive for abdominal pain. Negative for constipation, diarrhea, nausea and vomiting. Endocrine: Negative for polyuria. Genitourinary: Negative for dysuria. Musculoskeletal: Negative for gait problem. Skin: Negative for rash. Neurological: Negative for headaches. Psychiatric/Behavioral: Negative for dysphoric mood. Vitals:    01/10/18 0442   BP: 133/81   Pulse: 66   Resp: 16   Temp: 97.7 °F (36.5 °C)   SpO2: 98%   Weight: 86.2 kg (190 lb)   Height: 5' 11\" (1.803 m)            Physical Exam   Constitutional: He is oriented to person, place, and time. He appears well-developed and well-nourished. No distress. HENT:   Head: Normocephalic and atraumatic. Mouth/Throat: Oropharynx is clear and moist. No oropharyngeal exudate. Eyes: Conjunctivae and EOM are normal. Pupils are equal, round, and reactive to light. Right eye exhibits no discharge. Left eye exhibits no discharge. No scleral icterus. Neck: Normal range of motion. Neck supple. No JVD present. Cardiovascular: Normal rate, regular rhythm, normal heart sounds and intact distal pulses. Exam reveals no gallop and no friction rub. No murmur heard. Pulmonary/Chest: Effort normal and breath sounds normal. No stridor. No respiratory distress. He has no wheezes. He has no rales. He exhibits no tenderness. Abdominal: Soft. Bowel sounds are normal. He exhibits no distension and no mass. There is tenderness (RUQ/epigastrium). There is no rebound and no guarding. Musculoskeletal: Normal range of motion. He exhibits no edema or tenderness. Neurological: He is alert and oriented to person, place, and time. He has normal reflexes. No cranial nerve deficit. He exhibits normal muscle tone. Coordination normal.   Skin: Skin is warm and dry. No rash noted. No erythema. Psychiatric: He has a normal mood and affect. His behavior is normal. Judgment and thought content normal.        [facility]  ED Course       Procedures    ED EKG interpretation:  Rhythm: normal sinus rhythm; and regular . Rate (approx.): 63; Axis: normal; P wave: normal; QRS interval: normal ; ST/T wave: non-specific changes. This EKG was interpreted by Ruth Hart MD,ED Provider. LFTs/T bili elevated. Ultrasound ordered. Care signed over to Dr. Mercy Castro at change of shift. She will dispo patient pending ultrasound results.

## 2018-01-10 NOTE — PROGRESS NOTES
GI note    I know Dr. Inge Hays recommended MRI but with no stones in gallbladder and biliary duct 4mm I think we'll get more useful diagnostic information from CT abd with contrast (pancreatic parenchyma, luminal imaging, liver parenchyma). I've ordered same. Also need to fractionate the bilirubin to check for Gilbert's/hemolysis/indirect hyperbilirubinemia.   Sarah Bahena MD

## 2018-01-10 NOTE — IP AVS SNAPSHOT
[address] 
[phone] Patient: Raulito Remy MRN: [id number] :1955 About your hospitalization You were admitted on:  January 10, 2018 You last received care in the:  [facility] 5M1 MED SURG 1 You were discharged on:  January 15, 2018 Why you were hospitalized Your primary diagnosis was:  Choledocholithiasis With Acute Cholecystitis With Obstruction Your diagnoses also included:  Elevated Lfts, Hyperlipidemia, Gerd (Gastroesophageal Reflux Disease), Alcohol Use Follow-up Information Follow up With Details Comments Contact Info Olivia Villatoro MD Go on 2018 Hospital follow-up appointment at 10:15AM. Please take your discharge paperwork with you to your appointment. [address] [place] [address] 
[phone] Discharge Orders None A check terri indicates which time of day the medication should be taken. My Medications START taking these medications Instructions Each Dose to Equal  
 Morning Noon Evening Bedtime HYDROcodone-acetaminophen 5-325 mg per tablet Commonly known as:  Yodit Tineo Your last dose was: Your next dose is:    
   
   
 1 to 2 tablets every 4 hours as needed for pain CONTINUE taking these medications Instructions Each Dose to Equal  
 Morning Noon Evening Bedtime  
 aspirin 81 mg chewable tablet Your last dose was: Your next dose is: Take 81 mg by mouth nightly. 81 mg  
    
   
   
   
  
 CIALIS 5 mg tablet Generic drug:  tadalafil Your last dose was: Your next dose is: Take 5 mg by mouth daily. 5 mg  
    
   
   
   
  
 fenofibrate nanocrystallized 145 mg tablet Commonly known as:  [employer] Your last dose was: Your next dose is: Take 145 mg by mouth nightly. Indications: hypertriglyceridemia 145 mg FISH -1,000 mg capsule Generic drug:  fish oil-omega-3 fatty acids Your last dose was: Your next dose is: Take 1 Cap by mouth two (2) times a day. 1 Cap  
    
   
   
   
  
 omeprazole 20 mg capsule Commonly known as:  PRILOSEC Your last dose was: Your next dose is: Take 20 mg by mouth daily. 20 mg  
    
   
   
   
  
 OTHER(NON-FORMULARY) Your last dose was: Your next dose is: BERNA Root : 2 daily ZANTAC 150 mg tablet Generic drug:  raNITIdine Your last dose was: Your next dose is: Take 150 mg by mouth every evening. 150 mg Where to Get Your Medications Information on where to get these meds will be given to you by the nurse or doctor. ! Ask your nurse or doctor about these medications HYDROcodone-acetaminophen 5-325 mg per tablet Discharge Instructions Cholecystectomy: What to Expect at [facility] Your Recovery After your surgery, it is normal to feel weak and tired for several days after you return home. Your belly may be swollen. If you had laparoscopic surgery, you may also have pain in your shoulder for about 24 hours. You may have gas or need to burp a lot at first, and a few people get diarrhea. The diarrhea usually goes away in 2 to 4 weeks, but it may last longer. How quickly you recover depends on whether you had a laparoscopic or open surgery. · For a laparoscopic surgery, most people can go back to work or their normal routine in 1 to 2 weeks, but it may take longer, depending on the type of work you do. · For an open surgery, it will probably take 4 to 6 weeks before you get back to your normal routine.  
This care sheet gives you a general idea about how long it will take for you to recover. However, each person recovers at a different pace. Follow the steps below to get better as quickly as possible. How can you care for yourself at home? Activity ? · Rest when you feel tired. Getting enough sleep will help you recover. ? · Try to walk each day. Start out by walking a little more than you did the day before. Gradually increase the amount you walk. Walking boosts blood flow and helps prevent pneumonia and constipation. ? · For about 2 to 4 weeks, avoid lifting anything that would make you strain. This may include a child, heavy grocery bags and milk containers, a heavy briefcase or backpack, cat litter or dog food bags, or a vacuum . ? · Avoid strenuous activities, such as biking, jogging, weightlifting, and aerobic exercise, until your doctor says it is okay. ? · You may shower 24 to 48 hours after surgery, if your doctor okays it. Pat the cut (incision) dry. Do not take a bath for the first 2 weeks, or until your doctor tells you it is okay. ? · You may drive when you are no longer taking pain medicine and can quickly move your foot from the gas pedal to the brake. You must also be able to sit comfortably for a long period of time, even if you do not plan to go far. You might get caught in traffic. ? · For a laparoscopic surgery, most people can go back to work or their normal routine in 1 to 2 weeks, but it may take longer. For an open surgery, it will probably take 4 to 6 weeks before you get back to your normal routine. ? · Your doctor will tell you when you can have sex again. ? Diet ? · Eat smaller meals more often instead of fewer larger meals. You can eat a normal diet, but avoid eating fatty foods for about 1 month. Fatty foods include hamburger, whole milk, cheese, and many snack foods. If your stomach is upset, try bland, low-fat foods like plain rice, broiled chicken, toast, and yogurt. ? · Drink plenty of fluids (unless your doctor tells you not to). ? · If you have diarrhea, try avoiding spicy foods, dairy products, fatty foods, and alcohol. You can also watch to see if specific foods cause it, and stop eating them. If the diarrhea continues for more than 2 weeks, talk to your doctor. ? · You may notice that your bowel movements are not regular right after your surgery. This is common. Try to avoid constipation and straining with bowel movements. You may want to take a fiber supplement every day. If you have not had a bowel movement after a couple of days, ask your doctor about taking a mild laxative. Medicines ? · Your doctor will tell you if and when you can restart your medicines. He or she will also give you instructions about taking any new medicines. ? · If you take blood thinners, such as warfarin (Coumadin), clopidogrel (Plavix), or aspirin, be sure to talk to your doctor. He or she will tell you if and when to start taking those medicines again. Make sure that you understand exactly what your doctor wants you to do. ? · Take pain medicines exactly as directed. ¨ If the doctor gave you a prescription medicine for pain, take it as prescribed. ¨ If you are not taking a prescription pain medicine, take an over-the-counter medicine such as acetaminophen (Tylenol), ibuprofen (Advil, Motrin), or naproxen (Aleve). Read and follow all instructions on the label. ¨ Do not take two or more pain medicines at the same time unless the doctor told you to. Many pain medicines contain acetaminophen, which is Tylenol. Too much Tylenol can be harmful. ? · If you think your pain medicine is making you sick to your stomach: 
¨ Take your medicine after meals (unless your doctor tells you not to). ¨ Ask your doctor for a different pain medicine. ? · If your doctor prescribed antibiotics, take them as directed. Do not stop taking them just because you feel better.  You need to take the full course of antibiotics. Incision care ? · If you have strips of tape on the incision, or cut, leave the tape on for a week or until it falls off.  
? · After 24 to 48 hours, wash the area daily with warm, soapy water, and pat it dry. ? · You may have staples to hold the cut together. Keep them dry until your doctor takes them out. This is usually in 7 to 10 days. ? · Keep the area clean and dry. You may cover it with a gauze bandage if it weeps or rubs against clothing. Change the bandage every day. ?Ice ? · To reduce swelling and pain, put ice or a cold pack on your belly for 10 to 20 minutes at a time. Do this every 1 to 2 hours. Put a thin cloth between the ice and your skin. Follow-up care is a key part of your treatment and safety. Be sure to make and go to all appointments, and call your doctor if you are having problems. It's also a good idea to know your test results and keep a list of the medicines you take. When should you call for help? Call 911 anytime you think you may need emergency care. For example, call if: 
? · You passed out (lost consciousness). ? · You are short of breath. Lucina Soraida ? Call your doctor now or seek immediate medical care if: 
? · You are sick to your stomach and cannot drink fluids. ? · You have pain that does not get better when you take your pain medicine. ? · You cannot pass stools or gas. ? · You have signs of infection, such as: 
¨ Increased pain, swelling, warmth, or redness. ¨ Red streaks leading from the incision. ¨ Pus draining from the incision. ¨ A fever. ? · Bright red blood has soaked through the bandage over your incision. ? · You have loose stitches, or your incision comes open. ? · You have signs of a blood clot in your leg (called a deep vein thrombosis), such as: 
¨ Pain in your calf, back of knee, thigh, or groin. ¨ Redness and swelling in your leg or groin. ? Watch closely for any changes in your health, and be sure to contact your doctor if you have any problems. Where can you learn more? Go to http://aminata-viki.info/. Enter [phone] in the search box to learn more about \"Cholecystectomy: What to Expect at Home. \" Current as of: May 12, 2017 Content Version: 11.4 © 3251-0355 [employer]. Care instructions adapted under license by [employer] (which disclaims liability or warranty for this information). If you have questions about a medical condition or this instruction, always ask your healthcare professional. [place] [address] any warranty or liability for your use of this information. Please call to make a follow up appointment in 10 to 14 days Brett Lal MD, PhD, [facility] General Surgery at [address], [address] [facility] 
[phone] Fax [phone] Introducing [facility]! [facility] introduces [email or web address] patient portal. Now you can access parts of your medical record, email your doctor's office, and request medication refills online. 1. In your internet browser, go to [email or web address] 2. Click on the First Time User? Click Here link in the Sign In box. You will see the New Member Sign Up page. 3. Enter your Etology.com Access Code exactly as it appears below. You will not need to use this code after youve completed the sign-up process. If you do not sign up before the expiration date, you must request a new code. · Etology.com Access Code: [email or web address] Expires: 4/15/2018  8:56 AM 
 
4. Enter the last four digits of your Social Security Number (xxxx) and Date of Birth (mm/dd/yyyy) as indicated and click Submit. You will be taken to the next sign-up page. 5. Create a Etology.com ID. This will be your Etology.com login ID and cannot be changed, so think of one that is secure and easy to remember. 6. Create a [employer] password. You can change your password at any time. 7. Enter your Password Reset Question and Answer. This can be used at a later time if you forget your password. 8. Enter your e-mail address. You will receive e-mail notification when new information is available in [address] 9. Click Sign Up. You can now view and download portions of your medical record. 10. Click the Download Summary menu link to download a portable copy of your medical information. If you have questions, please visit the Frequently Asked Questions section of the [employer] website. Remember, [employer] is NOT to be used for urgent needs. For medical emergencies, dial 911. Now available from your iPhone and Android! Providers Seen During Your Hospitalization Provider Specialty Primary office phone Yanna Gunter MD Emergency Medicine [phone] May Grossman MD Emergency Medicine [phone] Sakina Peñaloza MD Internal Medicine [phone] Rad[facility] [phone] MD Alicia Bain [facility] [phone] Immunizations Administered for This Admission Name Date Influenza Vaccine (Quad) PF  Deferred () Your Primary Care Physician (PCP) Primary Care Physician Office Phone Office Fax Armando Sandip [phone] You are allergic to the following No active allergies Recent Documentation Height Weight BMI Smoking Status 1.803 m 86.2 kg 26.5 kg/m2 Never Smoker Emergency Contacts Name Discharge Info Relation Home Work Mobile [facility] DISCHARGE CAREGIVER [3] Spouse [3] [phone] Patient Belongings The following personal items are in your possession at time of discharge: 
  Dental Appliances: None  Visual Aid: None Please provide this summary of care documentation to your next provider. Signatures-by signing, you are acknowledging that this After Visit Summary has been reviewed with you and you have received a copy. Patient Signature:  ____________________________________________________________ Date:  ____________________________________________________________  
  
Huber Moritz Provider Signature:  ____________________________________________________________ Date:  ____________________________________________________________

## 2018-01-10 NOTE — PROGRESS NOTES
GI note    CT shows cholecystitis and choledocholithiasis. I've put him on schedule for ERCP but need to wait because of oral contrast (which I ordered) given at [phone]. Scheduled for 1400 today. Strict NPO now.     Norma Babcock MD

## 2018-01-10 NOTE — Clinical Note
Status[de-identified] Inpatient [101] Type of Bed: Medical [8] Inpatient Hospitalization Certified Necessary for the Following Reasons: 3. Patient receiving treatment that can only be provided in an inpatient setting (further clarification in H&P documentation) Admitting Diagnosis: Elevated LFTs [[id number]] Admitting Physician: Ross Dorado [[id number]] Attending Physician: Ross Dorado [[id number]] Estimated Length of Stay: 2 Midnights Discharge Plan[de-identified] Home with Office Follow-up

## 2018-01-10 NOTE — PERIOP NOTES
Jud Sickle  1955  [id number]    Situation:    Scheduled Procedure: Procedure(s):  ENDOSCOPIC RETROGRADE CHOLANGIOPANCREATOGRAPHY  Verbal report received from: Brenna Lawrence RN  Preoperative diagnosis: CBD stone    Background:    Procedure: Procedure(s):  ENDOSCOPIC RETROGRADE CHOLANGIOPANCREATOGRAPHY  Physician performing procedure; Dr. Sofi Mckeon RN    NPO Status/Last PO Intake: midnight    Pregnancy Test:Not applicable If yes, result: none    Is the patient taking Blood Thinners: NO If yes, list:  and last taken   Is the patient diabetic:no       If yes, what was the last BS:    Time taken? Anything given? no           Does the patient have a Pacemaker/Defibrillator in place?: no   Does the patient need antibiotics before/during/after procedure: yes. Zosyn has been ordered. If the patient is having a colon, How much prep was drank? What were the Colon prep results? Does the patient have SCD in place:no   Is patient on CONTACT precautions:no        If yes, what kind of CONTACT precautions:     Assessment:  Are the vital signs stable prior to patient coming to ENDO?  yes  Is the patient alert/oriented and able to sign consent for the procedures:yes    Does the patient have a patient IV in place? yes     Recommendation:  Family or Friend present yes     Permission to share finding with Family or Friend yes    ERCP scheduled for 1400 today.

## 2018-01-10 NOTE — PROGRESS NOTES
[facility]: MED RECONCILIATION    Information obtained from: Patient    Allergies: Review of patient's allergies indicates no known allergies. Prior to Admission Medications:     Medication Documentation Review Audit       Reviewed by Shanae Hernandez PHARMD (Pharmacist) on 01/10/18 at [phone]         Medication Sig Documenting Provider Last Dose Status Taking?      aspirin 81 mg chewable tablet Take 81 mg by mouth nightly. Historical Provider 1/9/2018 Active Yes    fenofibrate nanocrystallized (TRICOR) 145 mg tablet Take 145 mg by mouth nightly. Indications: hypertriglyceridemia Benjie Ley MD 1/9/2018 Active Yes    fish oil-omega-3 fatty acids (FISH OIL) 340-1,000 mg capsule Take 1 Cap by mouth two (2) times a day. Historical Provider 1/9/2018 Active Yes    omeprazole (PRILOSEC) 20 mg capsule Take 20 mg by mouth daily. Historical Provider 1/9/2018 Active Yes    Hank Villatoro Root : 2 daily Historical Provider 1/9/2018 Active Yes    ranitidine (ZANTAC) 150 mg tablet Take 150 mg by mouth every evening. Benjie Ley MD 1/9/2018 Active Yes    tadalafil (CIALIS) 5 mg tablet Take 5 mg by mouth daily.  Benjie Ley MD 1/9/2018 Active Yes                  Shanae Hernandez PHARMD   Contact: [phone]

## 2018-01-10 NOTE — PERIOP NOTES
Report form Jeronimo Alexander, see anesthesia record. ABD remains soft and non-tender post procedure. Pt has no complaints at this time and tolerated the procedure well.

## 2018-01-10 NOTE — PERIOP NOTES
TRANSFER - OUT REPORT:    Verbal report given to Bess Brenner RN (name) on Perla Sharma  being transferred to ER (unit) for ordered procedure       Report consisted of patients Situation, Background, Assessment and   Recommendations(SBAR). Information from the following report(s) Procedure Summary and Recent Results was reviewed with the receiving nurse. Lines:   Peripheral IV 01/10/18 Left Antecubital (Active)   Site Assessment Clean, dry, & intact 1/10/2018  1:08 PM   Phlebitis Assessment 0 1/10/2018  1:08 PM   Infiltration Assessment 0 1/10/2018  1:08 PM   Dressing Status Clean, dry, & intact 1/10/2018  1:08 PM   Dressing Type Tape;Transparent 1/10/2018  1:08 PM   Hub Color/Line Status Pink; Infusing 1/10/2018  1:08 PM   Action Taken Blood drawn 1/10/2018  5:01 AM        Opportunity for questions and clarification was provided.       Patient transported with:   [facility] [employer]

## 2018-01-10 NOTE — H&P
[facility]  [address] [address]  [phone]    Admission History and Physical      NAME:  Benedict Mathew   :   1955   MRN:  [id number]     PCP:  Coco Pacheco MD     Date/Time:  1/10/2018         Subjective:     CHIEF COMPLAINT: \"I had pain in my stomach\"     HISTORY OF PRESENT ILLNESS:     Mr. Juan Manuel Javier is a 63 y.o.  male with PMH of HAWA not on CPAP, GERD, XOL and alcohol use admitted for RUQ pain and transaminitis. He was evaluated in the ED and underwent a CT ab/pelvis showing \"Cholelithiasis and pericholecystic fluid highly worrisome for acute cholecystitis. Distal common bile duct calculi\". He subsequently was taken for ERCP by GI and is currently on antibiotics with plans for lap rehana in the AM. Pt currently without ab pain. No N/V. Past Medical History:   Diagnosis Date    GERD (gastroesophageal reflux disease)     Sleep apnea     mild - CPAP not indicated        Past Surgical History:   Procedure Laterality Date    HX APPENDECTOMY      HX ORTHOPAEDIC  2013    right shoulder    HX OTHER SURGICAL  2016    Inguinal hernia repair       Social History   Substance Use Topics    Smoking status: Never Smoker    Smokeless tobacco: Never Used    Alcohol use Yes      Comment: 3 bottles of beer everyday      FH:   HTN    No Known Allergies     Prior to Admission medications    Medication Sig Start Date End Date Taking? Authorizing Provider   tadalafil (CIALIS) 5 mg tablet Take 5 mg by mouth daily. Yes Benjie Ley MD   fish oil-omega-3 fatty acids (FISH OIL) 340-1,000 mg capsule Take 1 Cap by mouth two (2) times a day. Yes Historical Provider   omeprazole (PRILOSEC) 20 mg capsule Take 20 mg by mouth daily. Yes Historical Provider   Hansel Roberts Root : 2 daily   Yes Historical Provider   aspirin 81 mg chewable tablet Take 81 mg by mouth nightly.    Yes Historical Provider   ranitidine (ZANTAC) 150 mg tablet Take 150 mg by mouth every evening. Yes Phys Other, MD   fenofibrate nanocrystallized (TRICOR) 145 mg tablet Take 145 mg by mouth nightly. Indications: hypertriglyceridemia   Yes Phys Other, MD         Review of Systems:  (bold if positive, if negative)    Gen:  Eyes:  ENT:  CVS:  Pulm:  GI:    :    MS:  Skin:  Psych:  Endo:    Hem:  Renal:    Neuro: Ab pain        Objective:      VITALS:    Vital signs reviewed; most recent are:    Visit Vitals    BP (!) 147/97 (BP 1 Location: Right arm, BP Patient Position: Sitting)    Pulse 75    Temp 97.7 °F (36.5 °C)    Resp 15    Ht 5' 11\" (1.803 m)    Wt 86.2 kg (190 lb)    SpO2 98%    BMI 26.5 kg/m2     SpO2 Readings from Last 6 Encounters:   01/10/18 98%   10/05/14 94%   06/01/13 97%   05/31/13 99%    O2 Flow Rate (L/min): 2 l/min     Intake/Output Summary (Last 24 hours) at 01/10/18 1748  Last data filed at 01/10/18 1519   Gross per 24 hour   Intake             1000 ml   Output                0 ml   Net             1000 ml            Exam:     Physical Exam:    Gen:  Well-developed, well-nourished, in no acute distress  HEENT:  Pink conjunctivae, PERRL, hearing intact to voice, moist mucous membranes  Neck:  Supple, without masses, thyroid non-tender  Resp:  No accessory muscle use, clear breath sounds without wheezes rales or rhonchi  Card:  No murmurs, normal S1, S2 without thrills, bruits or peripheral edema  Abd:  Soft, non-tender, non-distended, normoactive bowel sounds are present, no palpable organomegaly  Lymph:  No cervical adenopathy  Musc:  No cyanosis or clubbing  Skin:  No rashes or ulcers, skin turgor is good  Neuro:  Cranial nerves 3-12 are grossly intact,  strength is 5/5 bilaterally, dorsi / plantarflexion strength is 5/5 bilaterally, follows commands appropriately  Psych:  Alert with good insight.   Oriented to person, place, and time       Labs:    Recent Labs      01/10/18   0457   WBC  6.2   HGB  16.4   HCT  46.2   PLT  151     Recent Labs      01/10/18   0457   NA  140   K  4.0   CL  105   CO2  26   GLU  124*   BUN  12   CREA  1.20   CA  9.0   ALB  4.3   SGOT  299*   ALT  270*     No components found for: GLPOC  No results for input(s): PH, PCO2, PO2, HCO3, FIO2 in the last 72 hours. Recent Labs      01/10/18   1204   INR  1.1     CT ab/pelvis =>   Cholelithiasis and pericholecystic fluid highly worrisome for acute  cholecystitis. Distal common bile duct calculi. Assessment/Plan:    Choledocholithiasis with acute cholecystitis with obstruction (1/10/2018)  -s/p ERCP with sphincterotomy   -continue IV antibiotics   -clear liquids until midnight then NPO for lap rehana in the AM   -continue IVF's   -GI and surgery on board       Elevated LFTs (1/10/2018) - 2/2 aforementioned   -monitor LFT's and for post ERCP pancreatitis       Hyperlipidemia (1/10/2018)  -resume home meds at discharge       GERD (gastroesophageal reflux disease) (1/10/2018)  -continue PPI and famotidine while in house       Alcohol use (1/10/2018) - denies h/o withdrawals. Drinks 1-2/day.  Relatively low risk for withdrawal but will monitor   -CIWA protocol     Surrogate decision maker: Wife    Total time spent with patient: [address] discussed with: Patient and Family    Discussed:  Code Status, Care Plan and D/C Planning    Prophylaxis:  SCD's    Probable Disposition:  Home w/Family           ___________________________________________________    Attending Physician: Scott Walton MD

## 2018-01-10 NOTE — ED NOTES
[facility].

## 2018-01-10 NOTE — ROUTINE PROCESS
[place]  1955  [id number]    Situation:  Verbal report received from: Darren Costa RN  Procedure: Procedure(s):  ENDOSCOPIC RETROGRADE CHOLANGIOPANCREATOGRAPHY with fluoro     Background:    Preoperative diagnosis: CBD stone  Postoperative diagnosis: * No post-op diagnosis entered *    :  Dr. Sabi Higgins  Assistant(s): Endoscopy Technician-1: Lucian Soulier  Endoscopy RN-1: Issa Head RN    Specimens: * No specimens in log *  H. Pylori  no    Assessment:  Intra-procedure medications     Anesthesia gave intra-procedure sedation and medications, see anesthesia flow sheet yes    Intravenous fluids: NS@ KVO     Vital signs stable     Abdominal assessment: round and soft   SCD's remain on both lower extremities connected to running SCD machine    Recommendation:  Discharge patient per MD order. Return to floor  Permission to share finding with family or friend yes.

## 2018-01-10 NOTE — ED NOTES
Pt resting in hallway in bed. No new complaints at this time. Given ice chips order. Denies abdominal pain.

## 2018-01-10 NOTE — PROGRESS NOTES
[facility]: MED RECONCILIATION    Comments/Recommendations:     Patient is off the unit receiving a procedure. Pharmacist will follow up at a later time.     Thank you      Sawyer Thompson, PharmD, BCPS

## 2018-01-10 NOTE — ED NOTES
7:00 AM  Change of shift to [facility]. Care of patient taken over from Dr. Reji López; H&P reviewed, handoff complete. Awaiting labs/imaging/consultant. CONSULT NOTE:  7:50 AM Dahlia De La Cruz MD spoke with Dr. Jonny Winkler, Consult for Surgery. Discussed available diagnostic tests and clinical findings. Provider is in agreement with care plans as outlined. Dr. Jonny Winkler states polyps should not be causing him pain. He does have a bili of 4.6 and requires further workup. CONSULT NOTE:  8:20 AM Dahlia De La Cruz MD spoke with Dr. Junior Jaime, Consult for Gastroenterology. Discussed available diagnostic tests and clinical findings. He is in agreement with care plans as outlined. Dr. Junior Jaime states the patient cannot go home with a Bili of 4.6 and needs to have MRCP. He will have a colleague see him in the ED    CONSULT NOTE:  8:28 Ebony Rose MD spoke with Dr. Silvia Barahona, Consult for Hospitalist.  Discussed available diagnostic tests and clinical findings. Provider is in agreement with care plans as outlined. Provider will evaluate the patient for admission to the hospital further management.

## 2018-01-11 LAB
ALBUMIN SERPL-MCNC: 3.9 G/DL (ref 3.5–5)
ALBUMIN/GLOB SERPL: 1.4 {RATIO} (ref 1.1–2.2)
ALP SERPL-CCNC: 111 U/L (ref 45–117)
ALT SERPL-CCNC: 415 U/L (ref 12–78)
ANION GAP SERPL CALC-SCNC: 7 MMOL/L (ref 5–15)
AST SERPL-CCNC: 278 U/L (ref 15–37)
ATRIAL RATE: 62 BPM
BASOPHILS # BLD: 0 K/UL (ref 0–0.1)
BASOPHILS NFR BLD: 0 % (ref 0–1)
BILIRUB DIRECT SERPL-MCNC: 3.9 MG/DL (ref 0–0.2)
BILIRUB SERPL-MCNC: 5.2 MG/DL (ref 0.2–1)
BUN SERPL-MCNC: 9 MG/DL (ref 6–20)
BUN/CREAT SERPL: 8 (ref 12–20)
CALCIUM SERPL-MCNC: 8.8 MG/DL (ref 8.5–10.1)
CALCULATED P AXIS, ECG09: 27 DEGREES
CALCULATED R AXIS, ECG10: -10 DEGREES
CALCULATED T AXIS, ECG11: 32 DEGREES
CHLORIDE SERPL-SCNC: 107 MMOL/L (ref 97–108)
CO2 SERPL-SCNC: 28 MMOL/L (ref 21–32)
CREAT SERPL-MCNC: 1.2 MG/DL (ref 0.7–1.3)
DIAGNOSIS, 93000: NORMAL
EOSINOPHIL # BLD: 0 K/UL (ref 0–0.4)
EOSINOPHIL NFR BLD: 0 % (ref 0–7)
ERYTHROCYTE [DISTWIDTH] IN BLOOD BY AUTOMATED COUNT: 12.7 % (ref 11.5–14.5)
GLOBULIN SER CALC-MCNC: 2.7 G/DL (ref 2–4)
GLUCOSE SERPL-MCNC: 115 MG/DL (ref 65–100)
HCT VFR BLD AUTO: 46.3 % (ref 36.6–50.3)
HGB BLD-MCNC: 15.7 G/DL (ref 12.1–17)
LIPASE SERPL-CCNC: >3000 U/L (ref 73–393)
LYMPHOCYTES # BLD: 0.4 K/UL (ref 0.8–3.5)
LYMPHOCYTES NFR BLD: 4 % (ref 12–49)
MAGNESIUM SERPL-MCNC: 1.9 MG/DL (ref 1.6–2.4)
MCH RBC QN AUTO: 30.9 PG (ref 26–34)
MCHC RBC AUTO-ENTMCNC: 33.9 G/DL (ref 30–36.5)
MCV RBC AUTO: 91.1 FL (ref 80–99)
MONOCYTES # BLD: 0.6 K/UL (ref 0–1)
MONOCYTES NFR BLD: 6 % (ref 5–13)
NEUTS SEG # BLD: 8.9 K/UL (ref 1.8–8)
NEUTS SEG NFR BLD: 90 % (ref 32–75)
P-R INTERVAL, ECG05: 168 MS
PLATELET # BLD AUTO: 138 K/UL (ref 150–400)
POTASSIUM SERPL-SCNC: 3.8 MMOL/L (ref 3.5–5.1)
PROT SERPL-MCNC: 6.6 G/DL (ref 6.4–8.2)
Q-T INTERVAL, ECG07: 414 MS
QRS DURATION, ECG06: 104 MS
QTC CALCULATION (BEZET), ECG08: 420 MS
RBC # BLD AUTO: 5.08 M/UL (ref 4.1–5.7)
SODIUM SERPL-SCNC: 142 MMOL/L (ref 136–145)
VENTRICULAR RATE, ECG03: 62 BPM
WBC # BLD AUTO: 9.9 K/UL (ref 4.1–11.1)

## 2018-01-11 PROCEDURE — 36415 COLL VENOUS BLD VENIPUNCTURE: CPT | Performed by: INTERNAL MEDICINE

## 2018-01-11 PROCEDURE — 83690 ASSAY OF LIPASE: CPT | Performed by: SURGERY

## 2018-01-11 PROCEDURE — 85025 COMPLETE CBC W/AUTO DIFF WBC: CPT | Performed by: INTERNAL MEDICINE

## 2018-01-11 PROCEDURE — 74011250636 HC RX REV CODE- 250/636: Performed by: SURGERY

## 2018-01-11 PROCEDURE — 80048 BASIC METABOLIC PNL TOTAL CA: CPT | Performed by: INTERNAL MEDICINE

## 2018-01-11 PROCEDURE — 65270000029 HC RM PRIVATE

## 2018-01-11 PROCEDURE — 74011000258 HC RX REV CODE- 258: Performed by: SURGERY

## 2018-01-11 PROCEDURE — 74011250636 HC RX REV CODE- 250/636: Performed by: INTERNAL MEDICINE

## 2018-01-11 PROCEDURE — 83735 ASSAY OF MAGNESIUM: CPT | Performed by: INTERNAL MEDICINE

## 2018-01-11 PROCEDURE — 80076 HEPATIC FUNCTION PANEL: CPT | Performed by: INTERNAL MEDICINE

## 2018-01-11 RX ORDER — ALBUTEROL SULFATE 0.83 MG/ML
2.5 SOLUTION RESPIRATORY (INHALATION) AS NEEDED
Status: CANCELLED | OUTPATIENT
Start: 2018-01-11

## 2018-01-11 RX ORDER — HYDROMORPHONE HYDROCHLORIDE 2 MG/ML
2 INJECTION, SOLUTION INTRAMUSCULAR; INTRAVENOUS; SUBCUTANEOUS
Status: DISCONTINUED | OUTPATIENT
Start: 2018-01-11 | End: 2018-01-14

## 2018-01-11 RX ORDER — SODIUM CHLORIDE, SODIUM LACTATE, POTASSIUM CHLORIDE, CALCIUM CHLORIDE 600; 310; 30; 20 MG/100ML; MG/100ML; MG/100ML; MG/100ML
125 INJECTION, SOLUTION INTRAVENOUS CONTINUOUS
Status: CANCELLED | OUTPATIENT
Start: 2018-01-11

## 2018-01-11 RX ORDER — HYDROMORPHONE HYDROCHLORIDE 1 MG/ML
0.5 INJECTION, SOLUTION INTRAMUSCULAR; INTRAVENOUS; SUBCUTANEOUS
Status: CANCELLED | OUTPATIENT
Start: 2018-01-11 | End: 2018-01-11

## 2018-01-11 RX ORDER — PROCHLORPERAZINE EDISYLATE 5 MG/ML
10 INJECTION INTRAMUSCULAR; INTRAVENOUS
Status: DISCONTINUED | OUTPATIENT
Start: 2018-01-11 | End: 2018-01-15 | Stop reason: HOSPADM

## 2018-01-11 RX ORDER — HYDROMORPHONE HYDROCHLORIDE 2 MG/ML
0.5 INJECTION, SOLUTION INTRAMUSCULAR; INTRAVENOUS; SUBCUTANEOUS
Status: DISCONTINUED | OUTPATIENT
Start: 2018-01-11 | End: 2018-01-15 | Stop reason: HOSPADM

## 2018-01-11 RX ORDER — DIPHENHYDRAMINE HYDROCHLORIDE 50 MG/ML
12.5 INJECTION, SOLUTION INTRAMUSCULAR; INTRAVENOUS AS NEEDED
Status: CANCELLED | OUTPATIENT
Start: 2018-01-11 | End: 2018-01-11

## 2018-01-11 RX ORDER — ONDANSETRON 2 MG/ML
4 INJECTION INTRAMUSCULAR; INTRAVENOUS AS NEEDED
Status: CANCELLED | OUTPATIENT
Start: 2018-01-11

## 2018-01-11 RX ADMIN — HYDROMORPHONE HYDROCHLORIDE 0.5 MG: 2 INJECTION INTRAMUSCULAR; INTRAVENOUS; SUBCUTANEOUS at 04:31

## 2018-01-11 RX ADMIN — HYDROMORPHONE HYDROCHLORIDE 2 MG: 2 INJECTION INTRAMUSCULAR; INTRAVENOUS; SUBCUTANEOUS at 15:48

## 2018-01-11 RX ADMIN — Medication 10 ML: at 22:59

## 2018-01-11 RX ADMIN — PROCHLORPERAZINE EDISYLATE 10 MG: 5 INJECTION INTRAMUSCULAR; INTRAVENOUS at 08:43

## 2018-01-11 RX ADMIN — PROCHLORPERAZINE EDISYLATE 10 MG: 5 INJECTION INTRAMUSCULAR; INTRAVENOUS at 15:48

## 2018-01-11 RX ADMIN — HYDROMORPHONE HYDROCHLORIDE 2 MG: 2 INJECTION INTRAMUSCULAR; INTRAVENOUS; SUBCUTANEOUS at 22:58

## 2018-01-11 RX ADMIN — HYDROMORPHONE HYDROCHLORIDE 2 MG: 2 INJECTION INTRAMUSCULAR; INTRAVENOUS; SUBCUTANEOUS at 08:44

## 2018-01-11 RX ADMIN — SODIUM CHLORIDE 3.38 G: 900 INJECTION, SOLUTION INTRAVENOUS at 13:40

## 2018-01-11 RX ADMIN — ONDANSETRON 4 MG: 2 INJECTION INTRAMUSCULAR; INTRAVENOUS at 01:23

## 2018-01-11 RX ADMIN — Medication 10 ML: at 20:10

## 2018-01-11 RX ADMIN — Medication 10 ML: at 13:41

## 2018-01-11 RX ADMIN — SODIUM CHLORIDE 3.38 G: 900 INJECTION, SOLUTION INTRAVENOUS at 20:10

## 2018-01-11 RX ADMIN — HYDROMORPHONE HYDROCHLORIDE 0.5 MG: 2 INJECTION INTRAMUSCULAR; INTRAVENOUS; SUBCUTANEOUS at 01:24

## 2018-01-11 RX ADMIN — PIPERACILLIN SODIUM AND TAZOBACTAM SODIUM 3.38 G: 3; .375 INJECTION, POWDER, LYOPHILIZED, FOR SOLUTION INTRAVENOUS at 04:33

## 2018-01-11 NOTE — PROGRESS NOTES
1/11/2018   CARE MANAGEMENT NOTE:  CM is following pt in the ER for initial discharge planning. EMR reviewed. CM met with pt's dtr at bedside to obtain hx for this needs assessment as pt was sleeping soundly. Reportedly, pt resides with his wife, Caroline Nash ([phone]) in a two story home with bedroom on the ground floor. There are two entry steps to the home. PTA, pt was ambulatory, indepn with ADLs, and drives. 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 Pt obtains medications from [facility] pharmacy in [facility]. He does not have home healthcare nor DME for home use. PCP is Dr. Kirstin Chatman. Plan is to return home upon discharge without any anticipated needs.   Otilia

## 2018-01-11 NOTE — PROGRESS NOTES
Daily Progress Note: 1/11/2018  Eloy Sandra MD    Assessment/Plan:   Choledocholithiasis with acute cholecystitis with obstruction (1/10/2018)  -s/p ERCP with sphincterotomy   -continue IV antibiotics and IVF   -NPO  -GI and surgery on board     Pancreatitis following procedure with lipase >3000  -NPO  -Pain meds       Elevated LFTs (1/10/2018) - 2/2 aforementioned   -monitor LFT's        Hyperlipidemia (1/10/2018)  -resume home meds at discharge        GERD (gastroesophageal reflux disease) (1/10/2018)  -continue PPI and famotidine while in house        Alcohol use (1/10/2018) - denies h/o withdrawals. Drinks 1-2/day. Relatively low risk for withdrawal but will monitor   -CIWA protocol        Problem List:  Problem List as of 1/11/2018  Date Reviewed: 1/10/2018          Codes Class Noted - Resolved    Elevated LFTs ICD-10-CM: R79.89  ICD-9-CM: 790.6  1/10/2018 - Present        * (Principal)Choledocholithiasis with acute cholecystitis with obstruction ICD-10-CM: K80.41  ICD-9-CM: 574.31  1/10/2018 - Present        Hyperlipidemia ICD-10-CM: E78.5  ICD-9-CM: 272.4  1/10/2018 - Present        GERD (gastroesophageal reflux disease) ICD-10-CM: K21.9  ICD-9-CM: 530.81  1/10/2018 - Present        Alcohol use ICD-10-CM: Z78.9  ICD-9-CM: V49.89  1/10/2018 - Present        Memory loss ICD-10-CM: R41.3  ICD-9-CM: 780.93  5/31/2013 - Present              HPI:   Mr. Zackery Wright is a 63 y.o.  male with PMH of HAWA not on CPAP, GERD, XOL and alcohol use admitted for RUQ pain and transaminitis. He was evaluated in the ED and underwent a CT ab/pelvis showing \"Cholelithiasis and pericholecystic fluid highly worrisome for acute cholecystitis. Distal common bile duct calculi\". He subsequently was taken for ERCP by GI and is currently on antibiotics with plans for lap rehana in the AM. Pt currently without ab pain. No N/V. (Dr Tremaine Chávez)    1/11/18: Feeling much worse. Had ERCP and now with pancreatitis.  Had uncontrolled pain last night. His Dilaudid has been increased and will add O2 as he has a history of HAWA and snoring. Lipase is >3000. Bili and LFTs higher. Family disappointed that he could not have surgery this am but understand why. Being held in ER as there are no beds available. Review of Systems:   A comprehensive review of systems was negative except for that written in the HPI. Objective:   Physical Exam:     Visit Vitals    /90    Pulse 75    Temp 97.7 °F (36.5 °C)    Resp 15    Ht 5' 11\" (1.803 m)    Wt 190 lb (86.2 kg)    SpO2 94%    BMI 26.5 kg/m2    O2 Flow Rate (L/min): 2 l/min O2 Device: Room air    Temp (24hrs), Av.8 °F (36.6 °C), Min:97.7 °F (36.5 °C), Max:97.8 °F (36.6 °C)         1901 -  0700  In: 1000 [I.V.:1000]  Out: -     General:  Sleepy, cooperative, Appears uncomfortable, appears stated age. Head:  Normocephalic, without obvious abnormality, atraumatic. Eyes:   PERRL, EOMs intact. Nose: Nares normal. Septum midline. Mucosa normal. No drainage or sinus tenderness. Throat: Lips, mucosa, and tongue normal. Teeth and gums normal.   Neck: Supple, symmetrical, trachea midline, no adenopathy, thyroid: no enlargement/tenderness/nodules, no carotid bruit and no JVD. Back:   Symmetric, no curvature. ROM normal. No CVA tenderness. Lungs:   Clear to auscultation bilaterally. Chest wall:  No tenderness or deformity. Heart:  Regular rate and rhythm, S1, S2 normal, no murmur, click, rub or gallop. Abdomen:   Slightly distended and tender. Bowel sounds normal. No masses,  No organomegaly. Extremities: Extremities normal, atraumatic, no cyanosis or edema. No calf tenderness or cords. Pulses: 2+ and symmetric all extremities. Skin: Jaundice present,  No rashes or lesions   Neurologic: CNII-XII intact. Alert and oriented X 3. Fine motor of hands and fingers normal.   equal.  No cogwheeling or rigidity. Gait not tested at this time. Sensation grossly normal to touch. Gross motor of extremities normal.       Data Review:       Recent Days:  Recent Labs      01/11/18   0424  01/10/18   0457   WBC  9.9  6.2   HGB  15.7  16.4   HCT  46.3  46.2   PLT  138*  151     Recent Labs      01/11/18   0424  01/10/18   1204  01/10/18   0457   NA  142   --   140   K  3.8   --   4.0   CL  107   --   105   CO2  28   --   26   GLU  115*   --   124*   BUN  9   --   12   CREA  1.20   --   1.20   CA  8.8   --   9.0   MG  1.9   --    --    ALB  3.9   --   4.3   TBILI  5.2*   --   4.6*   SGOT  278*   --   299*   ALT  415*   --   270*   INR   --   1.1   --      No results for input(s): PH, PCO2, PO2, HCO3, FIO2 in the last 72 hours. 24 Hour Results:  Recent Results (from the past 24 hour(s))   PROTHROMBIN TIME + INR    Collection Time: 01/10/18 12:04 PM   Result Value Ref Range    INR 1.1 0.9 - 1.1      Prothrombin time 10.6 9.0 - 11.1 sec   PTT    Collection Time: 01/10/18 12:04 PM   Result Value Ref Range    aPTT 24.5 22.1 - 32.5 sec    aPTT, therapeutic range     58.0 - 77.0 SECS   CBC WITH AUTOMATED DIFF    Collection Time: 01/11/18  4:24 AM   Result Value Ref Range    WBC 9.9 4.1 - 11.1 K/uL    RBC 5.08 4.10 - 5.70 M/uL    HGB 15.7 12.1 - 17.0 g/dL    HCT 46.3 36.6 - 50.3 %    MCV 91.1 80.0 - 99.0 FL    MCH 30.9 26.0 - 34.0 PG    MCHC 33.9 30.0 - 36.5 g/dL    RDW 12.7 11.5 - 14.5 %    PLATELET 041 (L) 852 - 400 K/uL    NEUTROPHILS 90 (H) 32 - 75 %    LYMPHOCYTES 4 (L) 12 - 49 %    MONOCYTES 6 5 - 13 %    EOSINOPHILS 0 0 - 7 %    BASOPHILS 0 0 - 1 %    ABS. NEUTROPHILS 8.9 (H) 1.8 - 8.0 K/UL    ABS. LYMPHOCYTES 0.4 (L) 0.8 - 3.5 K/UL    ABS. MONOCYTES 0.6 0.0 - 1.0 K/UL    ABS. EOSINOPHILS 0.0 0.0 - 0.4 K/UL    ABS.  BASOPHILS 0.0 0.0 - 0.1 K/UL   METABOLIC PANEL, BASIC    Collection Time: 01/11/18  4:24 AM   Result Value Ref Range    Sodium 142 136 - 145 mmol/L    Potassium 3.8 3.5 - 5.1 mmol/L    Chloride 107 97 - 108 mmol/L    CO2 28 21 - 32 mmol/L    Anion gap 7 5 - 15 mmol/L    Glucose 115 (H) 65 - 100 mg/dL    BUN 9 6 - 20 MG/DL    Creatinine 1.20 0.70 - 1.30 MG/DL    BUN/Creatinine ratio 8 (L) 12 - 20      GFR est AA >60 >60 ml/min/1.73m2    GFR est non-AA >60 >60 ml/min/1.73m2    Calcium 8.8 8.5 - 10.1 MG/DL   MAGNESIUM    Collection Time: 01/11/18  4:24 AM   Result Value Ref Range    Magnesium 1.9 1.6 - 2.4 mg/dL   HEPATIC FUNCTION PANEL    Collection Time: 01/11/18  4:24 AM   Result Value Ref Range    Protein, total 6.6 6.4 - 8.2 g/dL    Albumin 3.9 3.5 - 5.0 g/dL    Globulin 2.7 2.0 - 4.0 g/dL    A-G Ratio 1.4 1.1 - 2.2      Bilirubin, total 5.2 (H) 0.2 - 1.0 MG/DL    Bilirubin, direct 3.9 (H) 0.0 - 0.2 MG/DL    Alk.  phosphatase 111 45 - 117 U/L    AST (SGOT) 278 (H) 15 - 37 U/L    ALT (SGPT) 415 (H) 12 - 78 U/L   LIPASE    Collection Time: 01/11/18  4:24 AM   Result Value Ref Range    Lipase >3000 (H) 73 - 393 U/L       Medications reviewed  Current Facility-Administered Medications   Medication Dose Route Frequency    HYDROmorphone (DILAUDID) injection 2 mg  2 mg IntraVENous Q3H PRN    prochlorperazine (COMPAZINE) injection 10 mg  10 mg IntraVENous Q6H PRN    HYDROmorphone (DILAUDID) injection 0.5 mg  0.5 mg IntraVENous Q3H PRN    piperacillin-tazobactam (ZOSYN) 3.375 g in 0.9% sodium chloride 100 mL IVPB  3.375 g IntraVENous Q8H    sodium chloride (NS) flush 5-10 mL  5-10 mL IntraVENous Q8H    sodium chloride (NS) flush 5-10 mL  5-10 mL IntraVENous PRN    naloxone (NARCAN) injection 0.4 mg  0.4 mg IntraVENous PRN    ondansetron (ZOFRAN) injection 4 mg  4 mg IntraVENous Q4H PRN    iopamidol (ISOVUE 300) 61 % contrast injection 50 mL  50 mL IntraCATHeter MULTIPLE DOSE GIVEN    indomethacin (INDOCIN) rectal suppository 100 mg  100 mg Rectal ONCE PRN    glucagon (GLUCAGEN) injection 1 mg  1 mg IntraVENous PRN    0.9% sodium chloride infusion  125 mL/hr IntraVENous CONTINUOUS    HYDROcodone-acetaminophen (NORCO) 5-325 mg per tablet 1 Tab  1 Tab Oral Q6H PRN    LORazepam (ATIVAN) tablet 2 mg  2 mg Oral Q1H PRN    LORazepam (ATIVAN) tablet 4 mg  4 mg Oral Q1H PRN     Current Outpatient Prescriptions   Medication Sig    tadalafil (CIALIS) 5 mg tablet Take 5 mg by mouth daily.  fish oil-omega-3 fatty acids (FISH OIL) 340-1,000 mg capsule Take 1 Cap by mouth two (2) times a day.  omeprazole (PRILOSEC) 20 mg capsule Take 20 mg by mouth daily.  OTHER,NON-FORMULARY, BERNA Root : 2 daily    aspirin 81 mg chewable tablet Take 81 mg by mouth nightly.  ranitidine (ZANTAC) 150 mg tablet Take 150 mg by mouth every evening.  fenofibrate nanocrystallized (TRICOR) 145 mg tablet Take 145 mg by mouth nightly. Indications: hypertriglyceridemia       Care Plan discussed with: Patient/Family and Nurse    Total time spent with patient and review of records: 40 minutes.     Debe Nissen, MD

## 2018-01-11 NOTE — ED NOTES
Assumed care of patient. Introduced self as primary nurse using [address] [place] [address] Nw. Stretcher in low locked position with call bell within reach. Pt updated on plan of care and wait times. Pt instructed to use call bell if assistance is needed.

## 2018-01-11 NOTE — ROUTINE PROCESS
Bedside shift change report given to Remington Sandra RN (oncoming nurse) by Femi Lopez   (offgoing nurse). Report included the following information SBAR, Kardex and MAR.

## 2018-01-11 NOTE — ROUTINE PROCESS
TRANSFER - IN REPORT:    Verbal report received from Carlitos Barahona RN(name) on Toni Dey  being received from ED(unit) for routine progression of care      Report consisted of patients Situation, Background, Assessment and   Recommendations(SBAR). Information from the following report(s) SBAR was reviewed with the receiving nurse. Opportunity for questions and clarification was provided. Assessment completed upon patients arrival to unit and care assumed.

## 2018-01-11 NOTE — PROGRESS NOTES
Gastroenterology Progress Note    January 11, 2018  Admit Date: 1/10/2018         Narrative Assessment and Plan   · Post ERCP pancreatitis  · Cholecystitis  · Choledocholithiasis s/p ERCP with stone extraction    Plan  - conservative management for pancreatitis: IV fluids, NPO, prn analgesics  - repeat labs in AM  - agree with Dr. Ana Prince to delay surgery until pancreatitis improves    ---  Post ERCP pancreatitis  Hopefully won't be too severe  We are following. Saint Castle, MD     Subjective:   · Patient up in chair at bedside. Drowsy from pain medication. Per daughter and wife he is now finally getting resting. Last night after having dinner he developed severe abdominal pain associated with vomiting. AM labs revealed Tbili 5.2, increase in AST/ALT, and lipase >3000. ROS:  The previous review of systems on initial consultation / H&P is noted and reviewed. Specific changes noted above in HPI.     Current Medications:     Current Facility-Administered Medications   Medication Dose Route Frequency    HYDROmorphone (DILAUDID) injection 2 mg  2 mg IntraVENous Q3H PRN    prochlorperazine (COMPAZINE) injection 10 mg  10 mg IntraVENous Q6H PRN    HYDROmorphone (DILAUDID) injection 0.5 mg  0.5 mg IntraVENous Q3H PRN    piperacillin-tazobactam (ZOSYN) 3.375 g in 0.9% sodium chloride 100 mL IVPB  3.375 g IntraVENous Q8H    sodium chloride (NS) flush 5-10 mL  5-10 mL IntraVENous Q8H    sodium chloride (NS) flush 5-10 mL  5-10 mL IntraVENous PRN    naloxone (NARCAN) injection 0.4 mg  0.4 mg IntraVENous PRN    ondansetron (ZOFRAN) injection 4 mg  4 mg IntraVENous Q4H PRN    iopamidol (ISOVUE 300) 61 % contrast injection 50 mL  50 mL IntraCATHeter MULTIPLE DOSE GIVEN    indomethacin (INDOCIN) rectal suppository 100 mg  100 mg Rectal ONCE PRN    glucagon (GLUCAGEN) injection 1 mg  1 mg IntraVENous PRN    0.9% sodium chloride infusion  125 mL/hr IntraVENous CONTINUOUS    HYDROcodone-acetaminophen (NORCO) 5-325 mg per tablet 1 Tab  1 Tab Oral Q6H PRN    LORazepam (ATIVAN) tablet 2 mg  2 mg Oral Q1H PRN    LORazepam (ATIVAN) tablet 4 mg  4 mg Oral Q1H PRN       Objective:     VITALS:   Last 24hrs VS reviewed since prior progress note. Most recent are:  Visit Vitals    /88    Pulse 75    Temp 97.7 °F (36.5 °C)    Resp 15    Ht 5' 11\" (1.803 m)    Wt 86.2 kg (190 lb)    SpO2 98%    BMI 26.5 kg/m2     No data recorded. Intake/Output Summary (Last 24 hours) at 01/11/18 1516  Last data filed at 01/10/18 1519   Gross per 24 hour   Intake             1000 ml   Output                0 ml   Net             1000 ml       EXAM:  General: Comfortable, no distress    Skin:                jaundice  HEENT: Sclera icteric  Lungs:  No abnormal audible breath sounds. Speaking in complete sentences  Heart:  No abnormal audible heart sounds. Well perfused  Abdomen: Soft, nondistended, significant tenderness in epigastrium  Musc:  No skeletal defects or deformities  Neurologic:  Cranial nerves grossly intact, moves all 4 extremities  Psych:   Good insight. Not anxious nor agitated    Lab Data Reviewed:   Recent Labs      01/11/18   0424  01/10/18   0457   WBC  9.9  6.2   HGB  15.7  16.4   HCT  46.3  46.2   PLT  138*  151     Recent Labs      01/11/18   0424  01/10/18   1204  01/10/18   0457   NA  142   --   140   K  3.8   --   4.0   CL  107   --   105   CO2  28   --   26   GLU  115*   --   124*   BUN  9   --   12   CREA  1.20   --   1.20   CA  8.8   --   9.0   MG  1.9   --    --    ALB  3.9   --   4.3   TBILI  5.2*   --   4.6*   SGOT  278*   --   299*   ALT  415*   --   270*   INR   --   1.1   --      Lab Results   Component Value Date/Time    Glucose (POC) 92 04/18/2010 02:34 PM     No results for input(s): PH, PCO2, PO2, HCO3, FIO2 in the last 72 hours.   Recent Labs      01/10/18   1204   INR  1.1           Assessment:   (See above)  Principal Problem:    Choledocholithiasis with acute cholecystitis with obstruction (1/10/2018)    Active Problems:    Elevated LFTs (1/10/2018)      Hyperlipidemia (1/10/2018)      GERD (gastroesophageal reflux disease) (1/10/2018)      Alcohol use (1/10/2018)        Plan:   (See above)    Signed By:  Bryanna Weinstein PA-C  1/11/2018  3:16 PM

## 2018-01-11 NOTE — ED NOTES
Bedside shift change report [facility] ([facility] nurse) by Porter Andres RN (offgoing nurse). Report included the following information SBAR, Kardex, ED Summary, Procedure Summary, Intake/Output, MAR and Recent Results.

## 2018-01-11 NOTE — ROUTINE PROCESS
Primary Nurse Teressa Cash.  Justin Zapata, RN and Roro Robles RN performed a dual skin assessment on this patient No impairment noted  Braxton score is 23

## 2018-01-11 NOTE — ED NOTES
Bedside and Verbal shift change report given to Brionna Tony RN (oncoming nurse) by Kenia Valdez RN (offgoing nurse). Report included the following information SBAR, Kardex, ED Summary, Intake/Output, MAR and Recent Results.

## 2018-01-11 NOTE — PROGRESS NOTES
Progress Note    Patient: Jud Francis MRN: [id number]  SSN: [id number]    YOB: 1955  Age: 63 y.o. Sex: male      Admit Date: 1/10/2018    1 Day Post-Op    Procedure:  Procedure(s):  ENDOSCOPIC RETROGRADE CHOLANGIOPANCREATOGRAPHY with fluoro   ENDOSCOPIC STONE EXTRACTION/BALLOON SWEEP  SPHINCTEROTOMY    Subjective:     Mr. Ami Jim had a bad night and feels worse. He c/o abdominal pain and n/v.    Objective:     Visit Vitals    BP (!) 158/91    Pulse 75    Temp 97.7 °F (36.5 °C)    Resp 15    Ht 5' 11\" (1.803 m)    Wt 190 lb (86.2 kg)    SpO2 92%    BMI 26.5 kg/m2       Temp (24hrs), Av.8 °F (36.6 °C), Min:97.7 °F (36.5 °C), Max:97.8 °F (36.6 °C)      Physical Exam:    GENERAL: fatigued, cooperative, mild distress, EYE: positive findings: sclerae are icteric., ABDOMEN: Soft, distended, diffusely tender, but worse in epigastric region with guarding. Data Review: reviewed  ERCP.     Lab Review:   BMP:   Lab Results   Component Value Date/Time     2018 04:24 AM    K 3.8 2018 04:24 AM     2018 04:24 AM    CO2 28 2018 04:24 AM    AGAP 7 2018 04:24 AM     (H) 2018 04:24 AM    BUN 9 2018 04:24 AM    CREA 1.20 2018 04:24 AM    GFRAA >60 2018 04:24 AM    GFRNA >60 2018 04:24 AM     CMP:   Lab Results   Component Value Date/Time     2018 04:24 AM    K 3.8 2018 04:24 AM     2018 04:24 AM    CO2 28 2018 04:24 AM    AGAP 7 2018 04:24 AM     (H) 2018 04:24 AM    BUN 9 2018 04:24 AM    CREA 1.20 2018 04:24 AM    GFRAA >60 2018 04:24 AM    GFRNA >60 2018 04:24 AM    CA 8.8 2018 04:24 AM    MG 1.9 2018 04:24 AM    ALB 3.9 2018 04:24 AM    TP 6.6 2018 04:24 AM    GLOB 2.7 2018 04:24 AM    AGRAT 1.4 2018 04:24 AM    SGOT 278 (H) 2018 04:24 AM     (H) 2018 04:24 AM     CBC:   Lab Results Component Value Date/Time    WBC 9.9 01/11/2018 04:24 AM    HGB 15.7 01/11/2018 04:24 AM    HCT 46.3 01/11/2018 04:24 AM     (L) 01/11/2018 04:24 AM     Pancreatic Markers:   Lab Results   Component Value Date/Time    LPSE >3000 (H) 01/11/2018 04:24 AM       Assessment:     Hospital Problems  Date Reviewed: 1/10/2018          Codes Class Noted POA    Elevated LFTs ICD-10-CM: R79.89  ICD-9-CM: 790.6  1/10/2018 Yes        * (Principal)Choledocholithiasis with acute cholecystitis with obstruction ICD-10-CM: K80.41  ICD-9-CM: 574.31  1/10/2018 Yes        Hyperlipidemia ICD-10-CM: E78.5  ICD-9-CM: 272.4  1/10/2018 Yes        GERD (gastroesophageal reflux disease) ICD-10-CM: K21.9  ICD-9-CM: 530.81  1/10/2018 Yes        Alcohol use ICD-10-CM: Z78.9  ICD-9-CM: V49.89  1/10/2018 Yes              Plan/Recommendations/Medical Decision Making:     Mr. Tolu Quinteros has worsening pain secondary to pancreatitis. I have increased his IVF's. He will need to remain on bowel rest.  I have cancelled his lap rehana until the pancreatitis resolves. Continue Zosyn since bilirubin is still up. I have increased his Dilaudid. I discussed the plan with him and his wife. Will continue to follow.     Signed By: Frida Sin MD     January 11, 2018

## 2018-01-12 LAB
ALBUMIN SERPL-MCNC: 3.5 G/DL (ref 3.5–5)
ALBUMIN/GLOB SERPL: 1.1 {RATIO} (ref 1.1–2.2)
ALP SERPL-CCNC: 98 U/L (ref 45–117)
ALT SERPL-CCNC: 290 U/L (ref 12–78)
ANION GAP SERPL CALC-SCNC: 9 MMOL/L (ref 5–15)
AST SERPL-CCNC: 114 U/L (ref 15–37)
BASOPHILS # BLD: 0 K/UL (ref 0–0.1)
BASOPHILS NFR BLD: 0 % (ref 0–1)
BILIRUB SERPL-MCNC: 4.7 MG/DL (ref 0.2–1)
BUN SERPL-MCNC: 10 MG/DL (ref 6–20)
BUN/CREAT SERPL: 9 (ref 12–20)
CALCIUM SERPL-MCNC: 8.9 MG/DL (ref 8.5–10.1)
CHLORIDE SERPL-SCNC: 107 MMOL/L (ref 97–108)
CO2 SERPL-SCNC: 26 MMOL/L (ref 21–32)
CREAT SERPL-MCNC: 1.1 MG/DL (ref 0.7–1.3)
EOSINOPHIL # BLD: 0 K/UL (ref 0–0.4)
EOSINOPHIL NFR BLD: 0 % (ref 0–7)
ERYTHROCYTE [DISTWIDTH] IN BLOOD BY AUTOMATED COUNT: 13 % (ref 11.5–14.5)
GLOBULIN SER CALC-MCNC: 3.3 G/DL (ref 2–4)
GLUCOSE SERPL-MCNC: 80 MG/DL (ref 65–100)
HAV IGM SER QL: NONREACTIVE
HBV CORE IGM SER QL: NONREACTIVE
HBV SURFACE AG SER QL: <0.1 INDEX
HBV SURFACE AG SER QL: NEGATIVE
HCT VFR BLD AUTO: 44.2 % (ref 36.6–50.3)
HCV AB SERPL QL IA: NONREACTIVE
HCV COMMENT,HCGAC: NORMAL
HGB BLD-MCNC: 16.1 G/DL (ref 12.1–17)
LIPASE SERPL-CCNC: 1427 U/L (ref 73–393)
LYMPHOCYTES # BLD: 0.7 K/UL (ref 0.8–3.5)
LYMPHOCYTES NFR BLD: 5 % (ref 12–49)
MAGNESIUM SERPL-MCNC: 1.8 MG/DL (ref 1.6–2.4)
MCH RBC QN AUTO: 32.5 PG (ref 26–34)
MCHC RBC AUTO-ENTMCNC: 36.4 G/DL (ref 30–36.5)
MCV RBC AUTO: 89.3 FL (ref 80–99)
MONOCYTES # BLD: 1 K/UL (ref 0–1)
MONOCYTES NFR BLD: 6 % (ref 5–13)
NEUTS SEG # BLD: 13.9 K/UL (ref 1.8–8)
NEUTS SEG NFR BLD: 89 % (ref 32–75)
PLATELET # BLD AUTO: 124 K/UL (ref 150–400)
POTASSIUM SERPL-SCNC: 3.7 MMOL/L (ref 3.5–5.1)
PROT SERPL-MCNC: 6.8 G/DL (ref 6.4–8.2)
RBC # BLD AUTO: 4.95 M/UL (ref 4.1–5.7)
SODIUM SERPL-SCNC: 142 MMOL/L (ref 136–145)
SP1: NORMAL
SP2: NORMAL
SP3: NORMAL
WBC # BLD AUTO: 15.6 K/UL (ref 4.1–11.1)

## 2018-01-12 PROCEDURE — 65270000029 HC RM PRIVATE

## 2018-01-12 PROCEDURE — 77030020782 HC GWN BAIR PAWS FLX 3M -B

## 2018-01-12 PROCEDURE — 74011250636 HC RX REV CODE- 250/636: Performed by: SURGERY

## 2018-01-12 PROCEDURE — 83690 ASSAY OF LIPASE: CPT | Performed by: SURGERY

## 2018-01-12 PROCEDURE — 80053 COMPREHEN METABOLIC PANEL: CPT | Performed by: SURGERY

## 2018-01-12 PROCEDURE — 85025 COMPLETE CBC W/AUTO DIFF WBC: CPT | Performed by: SURGERY

## 2018-01-12 PROCEDURE — 36415 COLL VENOUS BLD VENIPUNCTURE: CPT | Performed by: SURGERY

## 2018-01-12 PROCEDURE — 74011250637 HC RX REV CODE- 250/637: Performed by: INTERNAL MEDICINE

## 2018-01-12 PROCEDURE — 74011250636 HC RX REV CODE- 250/636: Performed by: INTERNAL MEDICINE

## 2018-01-12 PROCEDURE — 74011000258 HC RX REV CODE- 258: Performed by: SURGERY

## 2018-01-12 PROCEDURE — 83735 ASSAY OF MAGNESIUM: CPT | Performed by: SURGERY

## 2018-01-12 RX ORDER — FAMOTIDINE 20 MG/1
20 TABLET, FILM COATED ORAL EVERY EVENING
Status: DISCONTINUED | OUTPATIENT
Start: 2018-01-12 | End: 2018-01-15 | Stop reason: HOSPADM

## 2018-01-12 RX ORDER — SODIUM CHLORIDE, SODIUM LACTATE, POTASSIUM CHLORIDE, CALCIUM CHLORIDE 600; 310; 30; 20 MG/100ML; MG/100ML; MG/100ML; MG/100ML
25 INJECTION, SOLUTION INTRAVENOUS CONTINUOUS
Status: DISCONTINUED | OUTPATIENT
Start: 2018-01-12 | End: 2018-01-15 | Stop reason: HOSPADM

## 2018-01-12 RX ORDER — PANTOPRAZOLE SODIUM 40 MG/1
40 TABLET, DELAYED RELEASE ORAL DAILY
Status: DISCONTINUED | OUTPATIENT
Start: 2018-01-12 | End: 2018-01-15 | Stop reason: HOSPADM

## 2018-01-12 RX ADMIN — PROCHLORPERAZINE EDISYLATE 10 MG: 5 INJECTION INTRAMUSCULAR; INTRAVENOUS at 04:15

## 2018-01-12 RX ADMIN — HYDROMORPHONE HYDROCHLORIDE 0.5 MG: 2 INJECTION INTRAMUSCULAR; INTRAVENOUS; SUBCUTANEOUS at 13:56

## 2018-01-12 RX ADMIN — HYDROMORPHONE HYDROCHLORIDE 2 MG: 2 INJECTION INTRAMUSCULAR; INTRAVENOUS; SUBCUTANEOUS at 20:52

## 2018-01-12 RX ADMIN — SODIUM CHLORIDE 3.38 G: 900 INJECTION, SOLUTION INTRAVENOUS at 04:17

## 2018-01-12 RX ADMIN — FAMOTIDINE 20 MG: 20 TABLET ORAL at 17:29

## 2018-01-12 RX ADMIN — SODIUM CHLORIDE 3.38 G: 900 INJECTION, SOLUTION INTRAVENOUS at 12:45

## 2018-01-12 RX ADMIN — PANTOPRAZOLE SODIUM 40 MG: 40 TABLET, DELAYED RELEASE ORAL at 09:29

## 2018-01-12 RX ADMIN — PIPERACILLIN SODIUM AND TAZOBACTAM SODIUM 3.38 G: 3; .375 INJECTION, POWDER, LYOPHILIZED, FOR SOLUTION INTRAVENOUS at 20:55

## 2018-01-12 RX ADMIN — HYDROMORPHONE HYDROCHLORIDE 0.5 MG: 2 INJECTION INTRAMUSCULAR; INTRAVENOUS; SUBCUTANEOUS at 17:37

## 2018-01-12 RX ADMIN — SODIUM CHLORIDE, SODIUM LACTATE, POTASSIUM CHLORIDE, AND CALCIUM CHLORIDE 125 ML/HR: 600; 310; 30; 20 INJECTION, SOLUTION INTRAVENOUS at 09:28

## 2018-01-12 RX ADMIN — ONDANSETRON 4 MG: 2 INJECTION INTRAMUSCULAR; INTRAVENOUS at 20:52

## 2018-01-12 RX ADMIN — ONDANSETRON 4 MG: 2 INJECTION INTRAMUSCULAR; INTRAVENOUS at 09:30

## 2018-01-12 RX ADMIN — PROCHLORPERAZINE EDISYLATE 10 MG: 5 INJECTION INTRAMUSCULAR; INTRAVENOUS at 13:57

## 2018-01-12 RX ADMIN — HYDROMORPHONE HYDROCHLORIDE 2 MG: 2 INJECTION INTRAMUSCULAR; INTRAVENOUS; SUBCUTANEOUS at 09:30

## 2018-01-12 RX ADMIN — SODIUM CHLORIDE, SODIUM LACTATE, POTASSIUM CHLORIDE, AND CALCIUM CHLORIDE 125 ML/HR: 600; 310; 30; 20 INJECTION, SOLUTION INTRAVENOUS at 20:51

## 2018-01-12 RX ADMIN — HYDROMORPHONE HYDROCHLORIDE 0.5 MG: 2 INJECTION INTRAMUSCULAR; INTRAVENOUS; SUBCUTANEOUS at 04:15

## 2018-01-12 NOTE — PROGRESS NOTES
Bedside and Verbal shift change report given to Nabil LANE (oncoming nurse) by Ashish Lockwood (offgoing nurse). Report included the following information SBAR, Kardex, Procedure Summary, Intake/Output, MAR and Recent Results.

## 2018-01-12 NOTE — PROGRESS NOTES
Bedside and Verbal shift change report given to Wallace LANE (oncoming nurse) by Vanda Agarwal (offgoing nurse). Report included the following information SBAR, Kardex, Intake/Output, MAR, Recent Results and Cardiac Rhythm afib.

## 2018-01-12 NOTE — PROGRESS NOTES
Daily Progress Note: 1/12/2018  Tamiko Boateng MD    Assessment/Plan:   Choledocholithiasis with acute cholecystitis with obstruction (1/10/2018)  -s/p ERCP with sphincterotomy   -continue IV antibiotics and IVF   -NPO  -GI and surgery on board     Pancreatitis following procedure with lipase >3000  -NPO  -Pain meds       Elevated LFTs (1/10/2018) - 2/2 aforementioned   -monitor LFT's        Hyperlipidemia (1/10/2018)  -resume home meds at discharge        GERD (gastroesophageal reflux disease) (1/10/2018)  -continue PPI and famotidine while in house        Alcohol use (1/10/2018) - denies h/o withdrawals. Drinks 1-2/day. Relatively low risk for withdrawal but will monitor   -WA protocol        Problem List:  Problem List as of 1/12/2018  Date Reviewed: 1/10/2018          Codes Class Noted - Resolved    Elevated LFTs ICD-10-CM: R79.89  ICD-9-CM: 790.6  1/10/2018 - Present        * (Principal)Choledocholithiasis with acute cholecystitis with obstruction ICD-10-CM: K80.41  ICD-9-CM: 574.31  1/10/2018 - Present        Hyperlipidemia ICD-10-CM: E78.5  ICD-9-CM: 272.4  1/10/2018 - Present        GERD (gastroesophageal reflux disease) ICD-10-CM: K21.9  ICD-9-CM: 530.81  1/10/2018 - Present        Alcohol use ICD-10-CM: Z78.9  ICD-9-CM: V49.89  1/10/2018 - Present        Memory loss ICD-10-CM: R41.3  ICD-9-CM: 780.93  5/31/2013 - Present              HPI:   Mr. Lars Vargas is a 63 y.o.  male with PMH of HAWA not on CPAP, GERD, XOL and alcohol use admitted for RUQ pain and transaminitis. He was evaluated in the ED and underwent a CT ab/pelvis showing \"Cholelithiasis and pericholecystic fluid highly worrisome for acute cholecystitis. Distal common bile duct calculi\". He subsequently was taken for ERCP by GI and is currently on antibiotics with plans for lap rehana in the AM. Pt currently without ab pain. No N/V. (Dr Raulito Villegas)    1/11/18: Feeling much worse. Had ERCP and now with pancreatitis.  Had uncontrolled pain last night. His Dilaudid has been increased and will add O2 as he has a history of HAWA and snoring. Lipase is >3000. Bili and LFTs higher. Family disappointed that he could not have surgery this am but understand why. Being held in ER as there are no beds available. :  Still has epigastric and mid back pain but somewhat controlled with Dilaudid. No N/V at this moment. He wants to try some liquids. LFTs down this AM - bili down to 4.7 from 5.2. WBC up. No temps. Lipase down to 1400 from >3000 yesterday. Review of Systems:   A comprehensive review of systems was negative except for that written in the HPI. Objective:   Physical Exam:     Visit Vitals    /89 (BP 1 Location: Right arm, BP Patient Position: At rest)    Pulse (!) 105    Temp 98.3 °F (36.8 °C)    Resp 16    Ht 5' 11\" (1.803 m)    Wt 86.2 kg (190 lb)    SpO2 95%    BMI 26.5 kg/m2    O2 Flow Rate (L/min): 2 l/min O2 Device: Nasal cannula    Temp (24hrs), Av.3 °F (36.8 °C), Min:97.9 °F (36.6 °C), Max:99 °F (37.2 °C)        01/10 07 -  1900  In: 5390 [I.V.:1125]  Out: -     General:  Sleepy, cooperative, Appears uncomfortable, appears stated age. Head:  Normocephalic, without obvious abnormality, atraumatic. Eyes:   PERRL, EOMs intact. Nose: Nares normal. Septum midline. Mucosa normal. No drainage or sinus tenderness. Throat: Lips, mucosa, and tongue normal. Teeth and gums normal.   Neck: Supple, symmetrical, trachea midline, no adenopathy, thyroid: no enlargement/tenderness/nodules, no carotid bruit and no JVD. Back:   Symmetric, no curvature. ROM normal. No CVA tenderness. Lungs:   Clear to auscultation bilaterally. Chest wall:  No tenderness or deformity. Heart:  Regular rate and rhythm, S1, S2 normal, no murmur, click, rub or gallop. Abdomen:   Slightly distended and tender. Bowel sounds normal. No masses,  No organomegaly.    Extremities: Extremities normal, atraumatic, no cyanosis or edema. No calf tenderness or cords. Pulses: 2+ and symmetric all extremities. Skin: Jaundice present,  No rashes or lesions   Neurologic: CNII-XII intact. Alert and oriented X 3. Fine motor of hands and fingers normal.   equal.  No cogwheeling or rigidity. Gait not tested at this time. Sensation grossly normal to touch. Gross motor of extremities normal.       Data Review:       Recent Days:  Recent Labs      01/12/18   0355  01/11/18   0424  01/10/18   0457   WBC  15.6*  9.9  6.2   HGB  16.1  15.7  16.4   HCT  44.2  46.3  46.2   PLT  124*  138*  151     Recent Labs      01/12/18 0355 01/11/18 0424  01/10/18   1204  01/10/18   0457   NA  142  142   --   140   K  3.7  3.8   --   4.0   CL  107  107   --   105   CO2  26  28   --   26   GLU  80  115*   --   124*   BUN  10  9   --   12   CREA  1.10  1.20   --   1.20   CA  8.9  8.8   --   9.0   MG  1.8  1.9   --    --    ALB  3.5  3.9   --   4.3   TBILI  4.7*  5.2*   --   4.6*   SGOT  114*  278*   --   299*   ALT  290*  415*   --   270*   INR   --    --   1.1   --      Lab Results   Component Value Date/Time    Lipase 1427 01/12/2018 03:55 AM     No results for input(s): PH, PCO2, PO2, HCO3, FIO2 in the last 72 hours. 24 Hour Results:  Recent Results (from the past 24 hour(s))   CBC WITH AUTOMATED DIFF    Collection Time: 01/12/18  3:55 AM   Result Value Ref Range    WBC 15.6 (H) 4.1 - 11.1 K/uL    RBC 4.95 4.10 - 5.70 M/uL    HGB 16.1 12.1 - 17.0 g/dL    HCT 44.2 36.6 - 50.3 %    MCV 89.3 80.0 - 99.0 FL    MCH 32.5 26.0 - 34.0 PG    MCHC 36.4 30.0 - 36.5 g/dL    RDW 13.0 11.5 - 14.5 %    PLATELET 132 (L) 009 - 400 K/uL    NEUTROPHILS 89 (H) 32 - 75 %    LYMPHOCYTES 5 (L) 12 - 49 %    MONOCYTES 6 5 - 13 %    EOSINOPHILS 0 0 - 7 %    BASOPHILS 0 0 - 1 %    ABS. NEUTROPHILS 13.9 (H) 1.8 - 8.0 K/UL    ABS. LYMPHOCYTES 0.7 (L) 0.8 - 3.5 K/UL    ABS. MONOCYTES 1.0 0.0 - 1.0 K/UL    ABS. EOSINOPHILS 0.0 0.0 - 0.4 K/UL    ABS.  BASOPHILS 0.0 0.0 - 0.1 K/UL   METABOLIC PANEL, COMPREHENSIVE    Collection Time: 01/12/18  3:55 AM   Result Value Ref Range    Sodium 142 136 - 145 mmol/L    Potassium 3.7 3.5 - 5.1 mmol/L    Chloride 107 97 - 108 mmol/L    CO2 26 21 - 32 mmol/L    Anion gap 9 5 - 15 mmol/L    Glucose 80 65 - 100 mg/dL    BUN 10 6 - 20 MG/DL    Creatinine 1.10 0.70 - 1.30 MG/DL    BUN/Creatinine ratio 9 (L) 12 - 20      GFR est AA >60 >60 ml/min/1.73m2    GFR est non-AA >60 >60 ml/min/1.73m2    Calcium 8.9 8.5 - 10.1 MG/DL    Bilirubin, total 4.7 (H) 0.2 - 1.0 MG/DL    ALT (SGPT) 290 (H) 12 - 78 U/L    AST (SGOT) 114 (H) 15 - 37 U/L    Alk.  phosphatase 98 45 - 117 U/L    Protein, total 6.8 6.4 - 8.2 g/dL    Albumin 3.5 3.5 - 5.0 g/dL    Globulin 3.3 2.0 - 4.0 g/dL    A-G Ratio 1.1 1.1 - 2.2     LIPASE    Collection Time: 01/12/18  3:55 AM   Result Value Ref Range    Lipase 1427 (H) 73 - 393 U/L   MAGNESIUM    Collection Time: 01/12/18  3:55 AM   Result Value Ref Range    Magnesium 1.8 1.6 - 2.4 mg/dL     Medications reviewed  Current Facility-Administered Medications   Medication Dose Route Frequency    HYDROmorphone (DILAUDID) injection 2 mg  2 mg IntraVENous Q3H PRN    prochlorperazine (COMPAZINE) injection 10 mg  10 mg IntraVENous Q6H PRN    HYDROmorphone (DILAUDID) injection 0.5 mg  0.5 mg IntraVENous Q3H PRN    piperacillin-tazobactam (ZOSYN) 3.375 g in 0.9% sodium chloride 100 mL IVPB  3.375 g IntraVENous Q8H    sodium chloride (NS) flush 5-10 mL  5-10 mL IntraVENous Q8H    sodium chloride (NS) flush 5-10 mL  5-10 mL IntraVENous PRN    naloxone (NARCAN) injection 0.4 mg  0.4 mg IntraVENous PRN    ondansetron (ZOFRAN) injection 4 mg  4 mg IntraVENous Q4H PRN    iopamidol (ISOVUE 300) 61 % contrast injection 50 mL  50 mL IntraCATHeter MULTIPLE DOSE GIVEN    indomethacin (INDOCIN) rectal suppository 100 mg  100 mg Rectal ONCE PRN    glucagon (GLUCAGEN) injection 1 mg  1 mg IntraVENous PRN    HYDROcodone-acetaminophen (NORCO) 5-325 mg per tablet 1 Tab  1 Tab Oral Q6H PRN    LORazepam (ATIVAN) tablet 2 mg  2 mg Oral Q1H PRN    LORazepam (ATIVAN) tablet 4 mg  4 mg Oral Q1H PRN     Care Plan discussed with: Patient/Family and Nurse  Total time spent with patient and review of records: 40 minutes.   Ronaldo Suarez MD

## 2018-01-12 NOTE — PROGRESS NOTES
Gastroenterology Progress Note    January 12, 2018  Admit Date: 1/10/2018         Narrative Assessment and Plan   · Post ERCP pancreatitis  · Leukocytosis - likely from pancreatitis  · Cholecystitis  · Choledocholithiasis s/p ERCP with stone extraction    Plan  - too soon to feed - keep NPO, continue IV fluids and prn analgesics. LFTs slowly trending down and Tbili slightly improved also  - repeat labs in AM  - continue antibiotics  - agree with Dr. Sharan Napoles to delay surgery until pancreatitis improves  - follow patient clinical course and decide if additional imaging needed    ---  Lipase is better but he still has significant back pain and epigastric tenderness. Too soon to advance diet. Continue IV fluids and analgesics. Clarence Loco MD       Subjective:   · Still with complaints of abdominal and back pain. Requiring pain medication for relief. Denies nausea or vomiting. ROS:  The previous review of systems on initial consultation / H&P is noted and reviewed. Specific changes noted above in HPI.     Current Medications:     Current Facility-Administered Medications   Medication Dose Route Frequency    pantoprazole (PROTONIX) tablet 40 mg  40 mg Oral DAILY    famotidine (PEPCID) tablet 20 mg  20 mg Oral QPM    lactated Ringers infusion  125 mL/hr IntraVENous CONTINUOUS    HYDROmorphone (DILAUDID) injection 2 mg  2 mg IntraVENous Q3H PRN    prochlorperazine (COMPAZINE) injection 10 mg  10 mg IntraVENous Q6H PRN    HYDROmorphone (DILAUDID) injection 0.5 mg  0.5 mg IntraVENous Q3H PRN    piperacillin-tazobactam (ZOSYN) 3.375 g in 0.9% sodium chloride 100 mL IVPB  3.375 g IntraVENous Q8H    sodium chloride (NS) flush 5-10 mL  5-10 mL IntraVENous Q8H    sodium chloride (NS) flush 5-10 mL  5-10 mL IntraVENous PRN    naloxone (NARCAN) injection 0.4 mg  0.4 mg IntraVENous PRN    ondansetron (ZOFRAN) injection 4 mg  4 mg IntraVENous Q4H PRN    iopamidol (ISOVUE 300) 61 % contrast injection 50 mL  50 mL IntraCATHeter MULTIPLE DOSE GIVEN    indomethacin (INDOCIN) rectal suppository 100 mg  100 mg Rectal ONCE PRN    glucagon (GLUCAGEN) injection 1 mg  1 mg IntraVENous PRN    HYDROcodone-acetaminophen (NORCO) 5-325 mg per tablet 1 Tab  1 Tab Oral Q6H PRN    LORazepam (ATIVAN) tablet 2 mg  2 mg Oral Q1H PRN    LORazepam (ATIVAN) tablet 4 mg  4 mg Oral Q1H PRN       Objective:     VITALS:   Last 24hrs VS reviewed since prior progress note. Most recent are:  Visit Vitals    /90    Pulse (!) 115    Temp 98 °F (36.7 °C)    Resp 16    Ht 5' 11\" (1.803 m)    Wt 86.2 kg (190 lb)    SpO2 94%    BMI 26.5 kg/m2     Temp (24hrs), Av.3 °F (36.8 °C), Min:97.9 °F (36.6 °C), Max:99 °F (37.2 °C)      Intake/Output Summary (Last 24 hours) at 18 1157  Last data filed at 18 1500   Gross per 24 hour   Intake              125 ml   Output                0 ml   Net              125 ml       EXAM:  General: Comfortable, no distress    Skin:                jaundice  HEENT: Sclera icteric  Lungs:  No abnormal audible breath sounds.   Speaking in complete sentences  Heart:  Sinus tachycardia  Abdomen: Soft, nondistended, slight decrease in epigastric tenderness  Musc:  No skeletal defects or deformities  Neurologic:  Cranial nerves grossly intact, moves all 4 extremities  Psych:   Good insight. Not anxious nor agitated    Lab Data Reviewed:   Recent Labs      18   0355  18   0424  01/10/18   0457   WBC  15.6*  9.9  6.2   HGB  16.1  15.7  16.4   HCT  44.2  46.3  46.2   PLT  124*  138*  151     Recent Labs      18   0355  18   0424  01/10/18   1204  01/10/18   0457   NA  142  142   --   140   K  3.7  3.8   --   4.0   CL  107  107   --   105   CO2  26  28   --   26   GLU  80  115*   --   124*   BUN  10  9   --   12   CREA  1.10  1.20   --   1.20   CA  8.9  8.8   --   9.0   MG  1.8  1.9   --    --    ALB  3.5  3.9   --   4.3   TBILI  4.7*  5.2*   --   4.6*   SGOT  114*  278*   -- 299*   ALT  290*  415*   --   270*   INR   --    --   1.1   --      Lab Results   Component Value Date/Time    Glucose (POC) 92 04/18/2010 02:34 PM     No results for input(s): PH, PCO2, PO2, HCO3, FIO2 in the last 72 hours.   Recent Labs      01/10/18   1204   INR  1.1           Assessment:   (See above)  Principal Problem:    Choledocholithiasis with acute cholecystitis with obstruction (1/10/2018)    Active Problems:    Elevated LFTs (1/10/2018)      Hyperlipidemia (1/10/2018)      GERD (gastroesophageal reflux disease) (1/10/2018)      Alcohol use (1/10/2018)        Plan:   (See above)    Signed By:  Carlitos Steel PA-C  1/12/2018  11:58 AM

## 2018-01-12 NOTE — ROUTINE PROCESS
Bedside and Verbal shift change report given to Hugh Shoemaker RN (oncoming nurse) by TORRIE Gonsalez (offgoing nurse). Report included the following information SBAR, Kardex, Procedure Summary, Intake/Output, MAR, Accordion, Recent Results and Med Rec Status.

## 2018-01-13 LAB
ALBUMIN SERPL-MCNC: 2.9 G/DL (ref 3.5–5)
ALBUMIN/GLOB SERPL: 0.9 {RATIO} (ref 1.1–2.2)
ALP SERPL-CCNC: 82 U/L (ref 45–117)
ALT SERPL-CCNC: 166 U/L (ref 12–78)
ANION GAP SERPL CALC-SCNC: 9 MMOL/L (ref 5–15)
AST SERPL-CCNC: 48 U/L (ref 15–37)
BASOPHILS # BLD: 0 K/UL (ref 0–0.1)
BASOPHILS NFR BLD: 0 % (ref 0–1)
BILIRUB SERPL-MCNC: 3.9 MG/DL (ref 0.2–1)
BUN SERPL-MCNC: 14 MG/DL (ref 6–20)
BUN/CREAT SERPL: 13 (ref 12–20)
CALCIUM SERPL-MCNC: 8.9 MG/DL (ref 8.5–10.1)
CHLORIDE SERPL-SCNC: 105 MMOL/L (ref 97–108)
CO2 SERPL-SCNC: 28 MMOL/L (ref 21–32)
CREAT SERPL-MCNC: 1.09 MG/DL (ref 0.7–1.3)
EOSINOPHIL # BLD: 0.1 K/UL (ref 0–0.4)
EOSINOPHIL NFR BLD: 1 % (ref 0–7)
ERYTHROCYTE [DISTWIDTH] IN BLOOD BY AUTOMATED COUNT: 12.6 % (ref 11.5–14.5)
GLOBULIN SER CALC-MCNC: 3.4 G/DL (ref 2–4)
GLUCOSE SERPL-MCNC: 82 MG/DL (ref 65–100)
HCT VFR BLD AUTO: 39.6 % (ref 36.6–50.3)
HGB BLD-MCNC: 13.7 G/DL (ref 12.1–17)
LIPASE SERPL-CCNC: 296 U/L (ref 73–393)
LYMPHOCYTES # BLD: 0.7 K/UL (ref 0.8–3.5)
LYMPHOCYTES NFR BLD: 6 % (ref 12–49)
MAGNESIUM SERPL-MCNC: 1.9 MG/DL (ref 1.6–2.4)
MCH RBC QN AUTO: 31.6 PG (ref 26–34)
MCHC RBC AUTO-ENTMCNC: 34.6 G/DL (ref 30–36.5)
MCV RBC AUTO: 91.2 FL (ref 80–99)
MONOCYTES # BLD: 0.7 K/UL (ref 0–1)
MONOCYTES NFR BLD: 6 % (ref 5–13)
NEUTS SEG # BLD: 10 K/UL (ref 1.8–8)
NEUTS SEG NFR BLD: 87 % (ref 32–75)
PLATELET # BLD AUTO: 116 K/UL (ref 150–400)
POTASSIUM SERPL-SCNC: 3.8 MMOL/L (ref 3.5–5.1)
PROT SERPL-MCNC: 6.3 G/DL (ref 6.4–8.2)
RBC # BLD AUTO: 4.34 M/UL (ref 4.1–5.7)
SODIUM SERPL-SCNC: 142 MMOL/L (ref 136–145)
WBC # BLD AUTO: 11.5 K/UL (ref 4.1–11.1)

## 2018-01-13 PROCEDURE — 74011250637 HC RX REV CODE- 250/637: Performed by: INTERNAL MEDICINE

## 2018-01-13 PROCEDURE — 74011250636 HC RX REV CODE- 250/636: Performed by: FAMILY MEDICINE

## 2018-01-13 PROCEDURE — 74011250636 HC RX REV CODE- 250/636: Performed by: INTERNAL MEDICINE

## 2018-01-13 PROCEDURE — 74011250636 HC RX REV CODE- 250/636: Performed by: SURGERY

## 2018-01-13 PROCEDURE — 65270000029 HC RM PRIVATE

## 2018-01-13 PROCEDURE — 83735 ASSAY OF MAGNESIUM: CPT | Performed by: SURGERY

## 2018-01-13 PROCEDURE — 36415 COLL VENOUS BLD VENIPUNCTURE: CPT | Performed by: SURGERY

## 2018-01-13 PROCEDURE — 80053 COMPREHEN METABOLIC PANEL: CPT | Performed by: SURGERY

## 2018-01-13 PROCEDURE — 77010033678 HC OXYGEN DAILY

## 2018-01-13 PROCEDURE — 83690 ASSAY OF LIPASE: CPT | Performed by: SURGERY

## 2018-01-13 PROCEDURE — 85025 COMPLETE CBC W/AUTO DIFF WBC: CPT | Performed by: SURGERY

## 2018-01-13 PROCEDURE — 74011000258 HC RX REV CODE- 258: Performed by: SURGERY

## 2018-01-13 RX ORDER — CLONIDINE HYDROCHLORIDE 0.1 MG/1
0.1 TABLET ORAL
Status: DISCONTINUED | OUTPATIENT
Start: 2018-01-13 | End: 2018-01-15 | Stop reason: HOSPADM

## 2018-01-13 RX ORDER — ENOXAPARIN SODIUM 100 MG/ML
40 INJECTION SUBCUTANEOUS EVERY 24 HOURS
Status: DISCONTINUED | OUTPATIENT
Start: 2018-01-13 | End: 2018-01-14

## 2018-01-13 RX ADMIN — HYDROMORPHONE HYDROCHLORIDE 0.5 MG: 2 INJECTION INTRAMUSCULAR; INTRAVENOUS; SUBCUTANEOUS at 16:11

## 2018-01-13 RX ADMIN — SODIUM CHLORIDE, SODIUM LACTATE, POTASSIUM CHLORIDE, AND CALCIUM CHLORIDE 125 ML/HR: 600; 310; 30; 20 INJECTION, SOLUTION INTRAVENOUS at 19:17

## 2018-01-13 RX ADMIN — ONDANSETRON 4 MG: 2 INJECTION INTRAMUSCULAR; INTRAVENOUS at 06:20

## 2018-01-13 RX ADMIN — PIPERACILLIN SODIUM AND TAZOBACTAM SODIUM 3.38 G: 3; .375 INJECTION, POWDER, LYOPHILIZED, FOR SOLUTION INTRAVENOUS at 05:04

## 2018-01-13 RX ADMIN — SODIUM CHLORIDE, SODIUM LACTATE, POTASSIUM CHLORIDE, AND CALCIUM CHLORIDE 125 ML/HR: 600; 310; 30; 20 INJECTION, SOLUTION INTRAVENOUS at 05:00

## 2018-01-13 RX ADMIN — ONDANSETRON 4 MG: 2 INJECTION INTRAMUSCULAR; INTRAVENOUS at 01:52

## 2018-01-13 RX ADMIN — HYDROMORPHONE HYDROCHLORIDE 2 MG: 2 INJECTION INTRAMUSCULAR; INTRAVENOUS; SUBCUTANEOUS at 22:20

## 2018-01-13 RX ADMIN — HYDROMORPHONE HYDROCHLORIDE 2 MG: 2 INJECTION INTRAMUSCULAR; INTRAVENOUS; SUBCUTANEOUS at 01:53

## 2018-01-13 RX ADMIN — PANTOPRAZOLE SODIUM 40 MG: 40 TABLET, DELAYED RELEASE ORAL at 09:58

## 2018-01-13 RX ADMIN — SODIUM CHLORIDE, SODIUM LACTATE, POTASSIUM CHLORIDE, AND CALCIUM CHLORIDE 125 ML/HR: 600; 310; 30; 20 INJECTION, SOLUTION INTRAVENOUS at 12:49

## 2018-01-13 RX ADMIN — ONDANSETRON 4 MG: 2 INJECTION INTRAMUSCULAR; INTRAVENOUS at 22:20

## 2018-01-13 RX ADMIN — FAMOTIDINE 20 MG: 20 TABLET ORAL at 19:17

## 2018-01-13 RX ADMIN — HYDROMORPHONE HYDROCHLORIDE 2 MG: 2 INJECTION INTRAMUSCULAR; INTRAVENOUS; SUBCUTANEOUS at 06:20

## 2018-01-13 RX ADMIN — PIPERACILLIN SODIUM AND TAZOBACTAM SODIUM 3.38 G: 3; .375 INJECTION, POWDER, LYOPHILIZED, FOR SOLUTION INTRAVENOUS at 20:58

## 2018-01-13 RX ADMIN — HYDROMORPHONE HYDROCHLORIDE 0.5 MG: 2 INJECTION INTRAMUSCULAR; INTRAVENOUS; SUBCUTANEOUS at 19:24

## 2018-01-13 RX ADMIN — PIPERACILLIN SODIUM AND TAZOBACTAM SODIUM 3.38 G: 3; .375 INJECTION, POWDER, LYOPHILIZED, FOR SOLUTION INTRAVENOUS at 12:50

## 2018-01-13 RX ADMIN — ENOXAPARIN SODIUM 40 MG: 40 INJECTION SUBCUTANEOUS at 09:58

## 2018-01-13 RX ADMIN — HYDROMORPHONE HYDROCHLORIDE 0.5 MG: 2 INJECTION INTRAMUSCULAR; INTRAVENOUS; SUBCUTANEOUS at 12:01

## 2018-01-13 NOTE — ROUTINE PROCESS
Bedside and Verbal shift change report given to Seymour Phoenix, RN (oncoming nurse) by Nikia Mcgarry RN (offgoing nurse). Report included the following information SBAR, Kardex, ED Summary, Intake/Output, MAR and Recent Results.

## 2018-01-13 NOTE — PROGRESS NOTES
Bedside and Verbal shift change report given to TORRIE Weir(oncoming nurse) by Wero Anthony (offgoing nurse). Report included the following information SBAR and Kardex.

## 2018-01-13 NOTE — PROGRESS NOTES
General Surgery at [address] [facility]  Pt reports pain much better. Denies nausea or vomiting. No bowel movement or flatus. Patient Vitals for the past 12 hrs:   Temp Pulse Resp BP SpO2   18 0902 98.9 °F (37.2 °C) 91 17 128/82 92 %   18 0350 97 °F (36.1 °C) 88 16 130/82 96 %     Temp (24hrs), Av.1 °F (36.7 °C), Min:96.7 °F (35.9 °C), Max:99.1 °F (37.3 °C)          Gen NAD  Lungs NC O2 for desat at night, dx'd mild HAWA several years ago  Abd Distended and tympanitic, no tenderness    LAB   CBC:   Lab Results   Component Value Date/Time    WBC 11.5 (H) 2018 04:58 AM    HGB 13.7 2018 04:58 AM    HCT 39.6 2018 04:58 AM     (L) 2018 04:58 AM     Liver Panel:   Lab Results   Component Value Date/Time    ALB 2.9 (L) 2018 04:58 AM    TP 6.3 (L) 2018 04:58 AM    GLOB 3.4 2018 04:58 AM    AGRAT 0.9 (L) 2018 04:58 AM    SGOT 48 (H) 2018 04:58 AM     (H) 2018 04:58 AM    AP 82 2018 04:58 AM     Pancreatic Markers:   Lab Results   Component Value Date/Time    LPSE 296 2018 04:58 AM       Assessment:   Active Hospital Problems    Diagnosis Date Noted    Elevated LFTs 01/10/2018    Choledocholithiasis with acute cholecystitis with obstruction 01/10/2018    Hyperlipidemia 01/10/2018    GERD (gastroesophageal reflux disease) 01/10/2018    Alcohol use 01/10/2018      Post ERCP pancreatitis has resolved (lab and clinically)    Rec/Plan:  Clear liquids  NPO after MN  Will discuss surgery with Dr. Vilma Christina who is on duty tomorrow.          Signed By: Heaven Acros MD     2018

## 2018-01-13 NOTE — PROGRESS NOTES
[facility] MD  [phone]           GI PROGRESS NOTE      NAME: Emilia Homans   :  1955   MRN:  [id number]       Subjective:   Feeling better. Pain almost resolved this am        VITALS:   Last 24hrs VS reviewed since prior progress note. Most recent are:  Visit Vitals    BP (!) 152/91 (BP 1 Location: Left arm, BP Patient Position: Sitting)    Pulse 98    Temp 98.2 °F (36.8 °C)    Resp 17    Ht 5' 11\" (1.803 m)    Wt 86.2 kg (190 lb)    SpO2 97%    BMI 26.5 kg/m2       Intake/Output Summary (Last 24 hours) at 18 1552  Last data filed at 18 0618   Gross per 24 hour   Intake              950 ml   Output                0 ml   Net              950 ml     PHYSICAL EXAM:  General: Alert, in no acute distress    HEENT: Anicteric sclerae. Lungs:  CTA Bilaterally. Heart:  Regular  rhythm,    Abdomen: Soft, Non distended, Non tender.  (+)Bowel sounds, no HSM  Extremities: No c/c/e    Lab Data Reviewed:   Recent Labs      188  18   0355   WBC  11.5*  15.6*   HGB  13.7  16.1   HCT  39.6  44.2   PLT  116*  124*     Recent Labs      18   0355   NA  142  142   K  3.8  3.7   CL  105  107   CO2  28  26   BUN  14  10   CREA  1.09  1.10   GLU  82  80   CA  8.9  8.9     Recent Labs      188  18   0355   SGOT  48*  114*   AP  82  98   TP  6.3*  6.8   ALB  2.9*  3.5   GLOB  3.4  3.3   LPSE  296  1427*       ________________________________________________________________________  Patient Active Problem List   Diagnosis Code    Memory loss R41.3    Elevated LFTs R79.89    Choledocholithiasis with acute cholecystitis with obstruction K80.41    Hyperlipidemia E78.5    GERD (gastroesophageal reflux disease) K21.9    Alcohol use Z78.9        Assessment:   · Choledocholitiasis- s/p ERCP with ES and stone removal.LFT's improving  · Pancreatitis- resolved.  Lipase nl today   Plan:   · Ok to start clears  · Cholecystectomy per surgery  · Following with you     Signed By: Toño Corona MD     1/13/2018  3:52 PM

## 2018-01-13 NOTE — PROGRESS NOTES
Daily Progress Note: 1/13/2018  Yari Danielson MD    Assessment/Plan:   Choledocholithiasis with acute cholecystitis with obstruction --  -s/p ERCP with sphincterotomy   -continue IV antibiotics and IVF   -NPO per GI  -GI and surgery on board     Pancreatitis following ERCP with lipase >3000 -- lipase has now normalized  -Pain control, NPO with IVF       Elevated LFTs (1/10/2018) - 2/2 aforementioned   -monitor LFT's - improving       Hyperlipidemia (1/10/2018)  -resume home meds at discharge        GERD (gastroesophageal reflux disease)  -continue PPI and famotidine while in house        Alcohol use (1/10/2018) - denies h/o withdrawals. Drinks 1-2/day. Relatively low risk for withdrawal but will monitor   -CIWA protocol   --no withdrawal    DVT proph - start lovenox since so surgery planned for immediate future       Problem List:  Problem List as of 1/13/2018  Date Reviewed: 1/10/2018          Codes Class Noted - Resolved    Elevated LFTs ICD-10-CM: R79.89  ICD-9-CM: 790.6  1/10/2018 - Present        * (Principal)Choledocholithiasis with acute cholecystitis with obstruction ICD-10-CM: K80.41  ICD-9-CM: 574.31  1/10/2018 - Present        Hyperlipidemia ICD-10-CM: E78.5  ICD-9-CM: 272.4  1/10/2018 - Present        GERD (gastroesophageal reflux disease) ICD-10-CM: K21.9  ICD-9-CM: 530.81  1/10/2018 - Present        Alcohol use ICD-10-CM: Z78.9  ICD-9-CM: V49.89  1/10/2018 - Present        Memory loss ICD-10-CM: R41.3  ICD-9-CM: 780.93  5/31/2013 - Present              HPI:   Mr. Satya Baugh is a 63 y.o.  male with PMH of HAWA not on CPAP, GERD, XOL and alcohol use admitted for RUQ pain and transaminitis. He was evaluated in the ED and underwent a CT ab/pelvis showing \"Cholelithiasis and pericholecystic fluid highly worrisome for acute cholecystitis. Distal common bile duct calculi\".  He subsequently was taken for ERCP by GI and is currently on antibiotics with plans for lap rehana in the AM. Pt currently without ab pain. No N/V. (Dr Maddison Rasheed)    18: Feeling much worse. Had ERCP and now with pancreatitis. Had uncontrolled pain last night. His Dilaudid has been increased and will add O2 as he has a history of HAWA and snoring. Lipase is >3000. Bili and LFTs higher. Family disappointed that he could not have surgery this am but understand why. Being held in ER as there are no beds available. :  Still has epigastric and mid back pain but somewhat controlled with Dilaudid. No N/V at this moment. He wants to try some liquids. LFTs down this AM - bili down to 4.7 from 5.2. WBC up. No temps. Lipase down to 1400 from >3000 yesterday. : Continues to improve. Continued lower back pain that is similar to what he gets when in the bed or sitting long term but walking in [place]ard and moving better in the bed has helped. Still with some abd. Pain but overall improved. Denies nausea, vomiting. No CP, SOB. Not hungry. Review of Systems:   A comprehensive review of systems was negative except for that written in the HPI. Objective:   Physical Exam:     Visit Vitals    /82 (BP 1 Location: Left arm, BP Patient Position: At rest)    Pulse 88    Temp 97 °F (36.1 °C)    Resp 16    Ht 5' 11\" (1.803 m)    Wt 86.2 kg (190 lb)    SpO2 96%    BMI 26.5 kg/m2    O2 Flow Rate (L/min): 2 l/min O2 Device: Nasal cannula    Temp (24hrs), Av.9 °F (36.6 °C), Min:96.7 °F (35.9 °C), Max:99.1 °F (37.3 °C)        1901 -  0700  In: 950 [P.O.:75; I.V.:875]  Out: -     General:  Awake, alert, cooperative   Head:  Normocephalic, without obvious abnormality, atraumatic. Eyes:   PERRL, EOMs intact. Nose: Nares normal. Septum midline. Throat: Lips, mucosa, and tongue normal. Teeth and gums normal.   Neck: Supple, symmetrical, trachea midline   Back:   Symmetric, no curvature. +ttp lower lumbar muscle bilaterally   Lungs:   Clear to auscultation bilaterally.    Heart:  Regular rate and rhythm, S1, S2 normal, no murmur, click, rub or gallop. Abdomen:   Slightly distended, BS normal, +ttp diffuse with no rebound or guarding, soft   Extremities: Extremities normal, atraumatic, no cyanosis or edema. No calf tenderness or cords. Pulses: 2+ and symmetric all extremities. Skin: Jaundice present,  No rashes or lesions   Neurologic: CNII-XII intact. Alert and oriented X 3. Data Review:       Recent Days:  Recent Labs      01/13/18 0458 01/12/18 0355 01/11/18 0424   WBC  11.5*  15.6*  9.9   HGB  13.7  16.1  15.7   HCT  39.6  44.2  46.3   PLT  116*  124*  138*     Recent Labs      01/13/18   0458  01/12/18   0355  01/11/18   0424  01/10/18   1204   NA  142  142  142   --    K  3.8  3.7  3.8   --    CL  105  107  107   --    CO2  28  26  28   --    GLU  82  80  115*   --    BUN  14  10  9   --    CREA  1.09  1.10  1.20   --    CA  8.9  8.9  8.8   --    MG  1.9  1.8  1.9   --    ALB  2.9*  3.5  3.9   --    TBILI  3.9*  4.7*  5.2*   --    SGOT  48*  114*  278*   --    ALT  166*  290*  415*   --    INR   --    --    --   1.1     Lab Results   Component Value Date/Time    Lipase 296 01/13/2018 04:58 AM     No results for input(s): PH, PCO2, PO2, HCO3, FIO2 in the last 72 hours.     24 Hour Results:  Recent Results (from the past 24 hour(s))   LIPASE    Collection Time: 01/13/18  4:58 AM   Result Value Ref Range    Lipase 296 73 - 393 U/L   MAGNESIUM    Collection Time: 01/13/18  4:58 AM   Result Value Ref Range    Magnesium 1.9 1.6 - 2.4 mg/dL   METABOLIC PANEL, COMPREHENSIVE    Collection Time: 01/13/18  4:58 AM   Result Value Ref Range    Sodium 142 136 - 145 mmol/L    Potassium 3.8 3.5 - 5.1 mmol/L    Chloride 105 97 - 108 mmol/L    CO2 28 21 - 32 mmol/L    Anion gap 9 5 - 15 mmol/L    Glucose 82 65 - 100 mg/dL    BUN 14 6 - 20 MG/DL    Creatinine 1.09 0.70 - 1.30 MG/DL    BUN/Creatinine ratio 13 12 - 20      GFR est AA >60 >60 ml/min/1.73m2    GFR est non-AA >60 >60 ml/min/1.73m2 Calcium 8.9 8.5 - 10.1 MG/DL    Bilirubin, total 3.9 (H) 0.2 - 1.0 MG/DL    ALT (SGPT) 166 (H) 12 - 78 U/L    AST (SGOT) 48 (H) 15 - 37 U/L    Alk. phosphatase 82 45 - 117 U/L    Protein, total 6.3 (L) 6.4 - 8.2 g/dL    Albumin 2.9 (L) 3.5 - 5.0 g/dL    Globulin 3.4 2.0 - 4.0 g/dL    A-G Ratio 0.9 (L) 1.1 - 2.2     CBC WITH AUTOMATED DIFF    Collection Time: 01/13/18  4:58 AM   Result Value Ref Range    WBC 11.5 (H) 4.1 - 11.1 K/uL    RBC 4.34 4.10 - 5.70 M/uL    HGB 13.7 12.1 - 17.0 g/dL    HCT 39.6 36.6 - 50.3 %    MCV 91.2 80.0 - 99.0 FL    MCH 31.6 26.0 - 34.0 PG    MCHC 34.6 30.0 - 36.5 g/dL    RDW 12.6 11.5 - 14.5 %    PLATELET 336 (L) 068 - 400 K/uL    NEUTROPHILS 87 (H) 32 - 75 %    LYMPHOCYTES 6 (L) 12 - 49 %    MONOCYTES 6 5 - 13 %    EOSINOPHILS 1 0 - 7 %    BASOPHILS 0 0 - 1 %    ABS. NEUTROPHILS 10.0 (H) 1.8 - 8.0 K/UL    ABS. LYMPHOCYTES 0.7 (L) 0.8 - 3.5 K/UL    ABS. MONOCYTES 0.7 0.0 - 1.0 K/UL    ABS. EOSINOPHILS 0.1 0.0 - 0.4 K/UL    ABS.  BASOPHILS 0.0 0.0 - 0.1 K/UL     Medications reviewed  Current Facility-Administered Medications   Medication Dose Route Frequency    pantoprazole (PROTONIX) tablet 40 mg  40 mg Oral DAILY    famotidine (PEPCID) tablet 20 mg  20 mg Oral QPM    lactated Ringers infusion  125 mL/hr IntraVENous CONTINUOUS    influenza vaccine 2017-18 (3 yrs+)(PF) (FLUZONE QUAD/FLUARIX QUAD) injection 0.5 mL  0.5 mL IntraMUSCular PRIOR TO DISCHARGE    piperacillin-tazobactam (ZOSYN) 3.375 g in 0.9% sodium chloride (MBP/ADV) 100 mL  3.375 g IntraVENous Q8H    HYDROmorphone (DILAUDID) injection 2 mg  2 mg IntraVENous Q3H PRN    prochlorperazine (COMPAZINE) injection 10 mg  10 mg IntraVENous Q6H PRN    HYDROmorphone (DILAUDID) injection 0.5 mg  0.5 mg IntraVENous Q3H PRN    sodium chloride (NS) flush 5-10 mL  5-10 mL IntraVENous Q8H    sodium chloride (NS) flush 5-10 mL  5-10 mL IntraVENous PRN    naloxone (NARCAN) injection 0.4 mg  0.4 mg IntraVENous PRN    [facility]dansetron [facility]) injection 4 mg  4 mg IntraVENous Q4H PRN    iopamidol (ISOVUE 300) 61 % contrast injection 50 mL  50 mL IntraCATHeter MULTIPLE DOSE GIVEN    indomethacin (INDOCIN) rectal suppository 100 mg  100 mg Rectal ONCE PRN    glucagon (GLUCAGEN) injection 1 mg  1 mg IntraVENous PRN    HYDROcodone-acetaminophen (NORCO) 5-325 mg per tablet 1 Tab  1 Tab Oral Q6H PRN    LORazepam (ATIVAN) tablet 2 mg  2 mg Oral Q1H PRN    LORazepam (ATIVAN) tablet 4 mg  4 mg Oral Q1H PRN     Care Plan discussed with: Patient/Family     Abdirahman Hinkle MD

## 2018-01-13 NOTE — PROGRESS NOTES
Problem: Falls - Risk of  Goal: *Absence of Falls  Document Juaquin Fall Risk and appropriate interventions in the flowsheet.    Outcome: Progressing Towards Goal  Fall Risk Interventions:            Medication Interventions: Patient to call before getting OOB, Teach patient to arise slowly

## 2018-01-14 ENCOUNTER — ANESTHESIA (OUTPATIENT)
Dept: SURGERY | Age: 63
DRG: 417 | End: 2018-01-14
Payer: COMMERCIAL

## 2018-01-14 ENCOUNTER — ANESTHESIA EVENT (OUTPATIENT)
Dept: SURGERY | Age: 63
DRG: 417 | End: 2018-01-14
Payer: COMMERCIAL

## 2018-01-14 LAB
ALBUMIN SERPL-MCNC: 2.8 G/DL (ref 3.5–5)
ALBUMIN/GLOB SERPL: 0.8 {RATIO} (ref 1.1–2.2)
ALP SERPL-CCNC: 75 U/L (ref 45–117)
ALT SERPL-CCNC: 126 U/L (ref 12–78)
ANION GAP SERPL CALC-SCNC: 8 MMOL/L (ref 5–15)
AST SERPL-CCNC: 43 U/L (ref 15–37)
BASOPHILS # BLD: 0 K/UL (ref 0–0.1)
BASOPHILS # BLD: 0 K/UL (ref 0–0.1)
BASOPHILS NFR BLD: 0 % (ref 0–1)
BASOPHILS NFR BLD: 0 % (ref 0–1)
BILIRUB SERPL-MCNC: 3.7 MG/DL (ref 0.2–1)
BUN SERPL-MCNC: 10 MG/DL (ref 6–20)
BUN/CREAT SERPL: 10 (ref 12–20)
CALCIUM SERPL-MCNC: 8.8 MG/DL (ref 8.5–10.1)
CHLORIDE SERPL-SCNC: 102 MMOL/L (ref 97–108)
CO2 SERPL-SCNC: 28 MMOL/L (ref 21–32)
CREAT SERPL-MCNC: 1.03 MG/DL (ref 0.7–1.3)
EOSINOPHIL # BLD: 0.1 K/UL (ref 0–0.4)
EOSINOPHIL # BLD: 0.1 K/UL (ref 0–0.4)
EOSINOPHIL NFR BLD: 1 % (ref 0–7)
EOSINOPHIL NFR BLD: 2 % (ref 0–7)
ERYTHROCYTE [DISTWIDTH] IN BLOOD BY AUTOMATED COUNT: 12.4 % (ref 11.5–14.5)
ERYTHROCYTE [DISTWIDTH] IN BLOOD BY AUTOMATED COUNT: 12.4 % (ref 11.5–14.5)
GLOBULIN SER CALC-MCNC: 3.5 G/DL (ref 2–4)
GLUCOSE SERPL-MCNC: 107 MG/DL (ref 65–100)
HCT VFR BLD AUTO: 35.7 % (ref 36.6–50.3)
HCT VFR BLD AUTO: 36.1 % (ref 36.6–50.3)
HGB BLD-MCNC: 12.5 G/DL (ref 12.1–17)
HGB BLD-MCNC: 12.7 G/DL (ref 12.1–17)
LIPASE SERPL-CCNC: 175 U/L (ref 73–393)
LYMPHOCYTES # BLD: 0.5 K/UL (ref 0.8–3.5)
LYMPHOCYTES # BLD: 0.6 K/UL (ref 0.8–3.5)
LYMPHOCYTES NFR BLD: 7 % (ref 12–49)
LYMPHOCYTES NFR BLD: 7 % (ref 12–49)
MCH RBC QN AUTO: 31.3 PG (ref 26–34)
MCH RBC QN AUTO: 32.2 PG (ref 26–34)
MCHC RBC AUTO-ENTMCNC: 34.6 G/DL (ref 30–36.5)
MCHC RBC AUTO-ENTMCNC: 35.6 G/DL (ref 30–36.5)
MCV RBC AUTO: 90.5 FL (ref 80–99)
MCV RBC AUTO: 90.6 FL (ref 80–99)
MONOCYTES # BLD: 0.6 K/UL (ref 0–1)
MONOCYTES # BLD: 0.7 K/UL (ref 0–1)
MONOCYTES NFR BLD: 7 % (ref 5–13)
MONOCYTES NFR BLD: 9 % (ref 5–13)
NEUTS SEG # BLD: 6.8 K/UL (ref 1.8–8)
NEUTS SEG # BLD: 7.1 K/UL (ref 1.8–8)
NEUTS SEG NFR BLD: 83 % (ref 32–75)
NEUTS SEG NFR BLD: 84 % (ref 32–75)
PLATELET # BLD AUTO: 109 K/UL (ref 150–400)
PLATELET # BLD AUTO: 113 K/UL (ref 150–400)
POTASSIUM SERPL-SCNC: 3.4 MMOL/L (ref 3.5–5.1)
PROT SERPL-MCNC: 6.3 G/DL (ref 6.4–8.2)
RBC # BLD AUTO: 3.94 M/UL (ref 4.1–5.7)
RBC # BLD AUTO: 3.99 M/UL (ref 4.1–5.7)
SODIUM SERPL-SCNC: 138 MMOL/L (ref 136–145)
WBC # BLD AUTO: 8.2 K/UL (ref 4.1–11.1)
WBC # BLD AUTO: 8.4 K/UL (ref 4.1–11.1)

## 2018-01-14 PROCEDURE — 74011250636 HC RX REV CODE- 250/636

## 2018-01-14 PROCEDURE — 77030018836 HC SOL IRR NACL ICUM -A: Performed by: SURGERY

## 2018-01-14 PROCEDURE — 77030002895 HC DEV VASC CLOSR COVD -B: Performed by: SURGERY

## 2018-01-14 PROCEDURE — 77030020263 HC SOL INJ SOD CL0.9% LFCR 1000ML: Performed by: SURGERY

## 2018-01-14 PROCEDURE — 77030026438 HC STYL ET INTUB CARD -A: Performed by: NURSE ANESTHETIST, CERTIFIED REGISTERED

## 2018-01-14 PROCEDURE — 74011250636 HC RX REV CODE- 250/636: Performed by: FAMILY MEDICINE

## 2018-01-14 PROCEDURE — 76010000138 HC OR TIME 0.5 TO 1 HR: Performed by: SURGERY

## 2018-01-14 PROCEDURE — 88304 TISSUE EXAM BY PATHOLOGIST: CPT | Performed by: SURGERY

## 2018-01-14 PROCEDURE — 74011250637 HC RX REV CODE- 250/637: Performed by: INTERNAL MEDICINE

## 2018-01-14 PROCEDURE — 77030035048 HC TRCR ENDOSC OPTCL COVD -B: Performed by: SURGERY

## 2018-01-14 PROCEDURE — 77030032490 HC SLV COMPR SCD KNE COVD -B: Performed by: SURGERY

## 2018-01-14 PROCEDURE — 80053 COMPREHEN METABOLIC PANEL: CPT | Performed by: FAMILY MEDICINE

## 2018-01-14 PROCEDURE — 77030012029 HC APPL CLP LIG COVD -C: Performed by: SURGERY

## 2018-01-14 PROCEDURE — 77030011640 HC PAD GRND REM COVD -A: Performed by: SURGERY

## 2018-01-14 PROCEDURE — 77030035045 HC TRCR ENDOSC VRSPRT BLDLSS COVD -B: Performed by: SURGERY

## 2018-01-14 PROCEDURE — 65270000029 HC RM PRIVATE

## 2018-01-14 PROCEDURE — 77030008684 HC TU ET CUF COVD -B: Performed by: ANESTHESIOLOGY

## 2018-01-14 PROCEDURE — 76060000032 HC ANESTHESIA 0.5 TO 1 HR: Performed by: SURGERY

## 2018-01-14 PROCEDURE — 77030020747 HC TU INSUF ENDOSC TELE -A: Performed by: SURGERY

## 2018-01-14 PROCEDURE — 77030035051: Performed by: SURGERY

## 2018-01-14 PROCEDURE — 77030008771 HC TU NG SALEM SUMP -A: Performed by: ANESTHESIOLOGY

## 2018-01-14 PROCEDURE — 77030033269 HC SLV COMPR SCD KNE2 CARD -B

## 2018-01-14 PROCEDURE — 74011250636 HC RX REV CODE- 250/636: Performed by: SURGERY

## 2018-01-14 PROCEDURE — 77030009403 HC ELECTRD ENDO MEGA -B: Performed by: SURGERY

## 2018-01-14 PROCEDURE — 74011250637 HC RX REV CODE- 250/637: Performed by: SURGERY

## 2018-01-14 PROCEDURE — 74011000250 HC RX REV CODE- 250: Performed by: SURGERY

## 2018-01-14 PROCEDURE — 83690 ASSAY OF LIPASE: CPT | Performed by: FAMILY MEDICINE

## 2018-01-14 PROCEDURE — 77030010507 HC ADH SKN DERMBND J&J -B: Performed by: SURGERY

## 2018-01-14 PROCEDURE — 74011250636 HC RX REV CODE- 250/636: Performed by: ANESTHESIOLOGY

## 2018-01-14 PROCEDURE — 76210000006 HC OR PH I REC 0.5 TO 1 HR: Performed by: SURGERY

## 2018-01-14 PROCEDURE — 77030008756 HC TU IRR SUC STRY -B: Performed by: SURGERY

## 2018-01-14 PROCEDURE — 77030018390 HC SPNG HEMSTAT2 J&J -B: Performed by: SURGERY

## 2018-01-14 PROCEDURE — 74011000258 HC RX REV CODE- 258: Performed by: SURGERY

## 2018-01-14 PROCEDURE — 77030031139 HC SUT VCRL2 J&J -A: Performed by: SURGERY

## 2018-01-14 PROCEDURE — 74011000250 HC RX REV CODE- 250

## 2018-01-14 PROCEDURE — 74011250636 HC RX REV CODE- 250/636: Performed by: INTERNAL MEDICINE

## 2018-01-14 PROCEDURE — 36415 COLL VENOUS BLD VENIPUNCTURE: CPT | Performed by: SURGERY

## 2018-01-14 PROCEDURE — 77030009852 HC PCH RTVR ENDOSC COVD -B: Performed by: SURGERY

## 2018-01-14 PROCEDURE — 0FT44ZZ RESECTION OF GALLBLADDER, PERCUTANEOUS ENDOSCOPIC APPROACH: ICD-10-PCS | Performed by: SURGERY

## 2018-01-14 PROCEDURE — 85025 COMPLETE CBC W/AUTO DIFF WBC: CPT | Performed by: SURGERY

## 2018-01-14 PROCEDURE — 77010033678 HC OXYGEN DAILY

## 2018-01-14 PROCEDURE — 77030037032 HC INSRT SCIS CLICKLLINE DISP STOR -B: Performed by: SURGERY

## 2018-01-14 RX ORDER — MIDAZOLAM HYDROCHLORIDE 1 MG/ML
2 INJECTION, SOLUTION INTRAMUSCULAR; INTRAVENOUS
Status: DISCONTINUED | OUTPATIENT
Start: 2018-01-14 | End: 2018-01-14 | Stop reason: HOSPADM

## 2018-01-14 RX ORDER — MIDAZOLAM HYDROCHLORIDE 1 MG/ML
INJECTION, SOLUTION INTRAMUSCULAR; INTRAVENOUS AS NEEDED
Status: DISCONTINUED | OUTPATIENT
Start: 2018-01-14 | End: 2018-01-14 | Stop reason: HOSPADM

## 2018-01-14 RX ORDER — BUPIVACAINE HYDROCHLORIDE AND EPINEPHRINE 5; 5 MG/ML; UG/ML
INJECTION, SOLUTION EPIDURAL; INTRACAUDAL; PERINEURAL AS NEEDED
Status: DISCONTINUED | OUTPATIENT
Start: 2018-01-14 | End: 2018-01-14 | Stop reason: HOSPADM

## 2018-01-14 RX ORDER — FENTANYL CITRATE 50 UG/ML
INJECTION, SOLUTION INTRAMUSCULAR; INTRAVENOUS AS NEEDED
Status: DISCONTINUED | OUTPATIENT
Start: 2018-01-14 | End: 2018-01-14 | Stop reason: HOSPADM

## 2018-01-14 RX ORDER — ROCURONIUM BROMIDE 10 MG/ML
INJECTION, SOLUTION INTRAVENOUS AS NEEDED
Status: DISCONTINUED | OUTPATIENT
Start: 2018-01-14 | End: 2018-01-14 | Stop reason: HOSPADM

## 2018-01-14 RX ORDER — SODIUM CHLORIDE, SODIUM LACTATE, POTASSIUM CHLORIDE, CALCIUM CHLORIDE 600; 310; 30; 20 MG/100ML; MG/100ML; MG/100ML; MG/100ML
150 INJECTION, SOLUTION INTRAVENOUS CONTINUOUS
Status: DISCONTINUED | OUTPATIENT
Start: 2018-01-14 | End: 2018-01-14 | Stop reason: HOSPADM

## 2018-01-14 RX ORDER — LIDOCAINE HYDROCHLORIDE 20 MG/ML
INJECTION, SOLUTION EPIDURAL; INFILTRATION; INTRACAUDAL; PERINEURAL AS NEEDED
Status: DISCONTINUED | OUTPATIENT
Start: 2018-01-14 | End: 2018-01-14 | Stop reason: HOSPADM

## 2018-01-14 RX ORDER — ENOXAPARIN SODIUM 100 MG/ML
40 INJECTION SUBCUTANEOUS EVERY 24 HOURS
Status: DISCONTINUED | OUTPATIENT
Start: 2018-01-15 | End: 2018-01-15 | Stop reason: HOSPADM

## 2018-01-14 RX ORDER — HYDROCODONE BITARTRATE AND ACETAMINOPHEN 5; 325 MG/1; MG/1
2 TABLET ORAL
Status: DISCONTINUED | OUTPATIENT
Start: 2018-01-14 | End: 2018-01-15 | Stop reason: HOSPADM

## 2018-01-14 RX ORDER — HYDROMORPHONE HYDROCHLORIDE 2 MG/ML
.25-1 INJECTION, SOLUTION INTRAMUSCULAR; INTRAVENOUS; SUBCUTANEOUS
Status: DISCONTINUED | OUTPATIENT
Start: 2018-01-14 | End: 2018-01-14 | Stop reason: HOSPADM

## 2018-01-14 RX ORDER — SODIUM CHLORIDE, SODIUM LACTATE, POTASSIUM CHLORIDE, CALCIUM CHLORIDE 600; 310; 30; 20 MG/100ML; MG/100ML; MG/100ML; MG/100ML
125 INJECTION, SOLUTION INTRAVENOUS CONTINUOUS
Status: DISCONTINUED | OUTPATIENT
Start: 2018-01-14 | End: 2018-01-14 | Stop reason: HOSPADM

## 2018-01-14 RX ORDER — SUCCINYLCHOLINE CHLORIDE 20 MG/ML
INJECTION INTRAMUSCULAR; INTRAVENOUS AS NEEDED
Status: DISCONTINUED | OUTPATIENT
Start: 2018-01-14 | End: 2018-01-14 | Stop reason: HOSPADM

## 2018-01-14 RX ORDER — POTASSIUM CHLORIDE 7.45 MG/ML
10 INJECTION INTRAVENOUS ONCE
Status: COMPLETED | OUTPATIENT
Start: 2018-01-14 | End: 2018-01-14

## 2018-01-14 RX ORDER — LIDOCAINE HYDROCHLORIDE 10 MG/ML
0.1 INJECTION, SOLUTION EPIDURAL; INFILTRATION; INTRACAUDAL; PERINEURAL AS NEEDED
Status: DISCONTINUED | OUTPATIENT
Start: 2018-01-14 | End: 2018-01-14 | Stop reason: HOSPADM

## 2018-01-14 RX ORDER — PROPOFOL 10 MG/ML
INJECTION, EMULSION INTRAVENOUS AS NEEDED
Status: DISCONTINUED | OUTPATIENT
Start: 2018-01-14 | End: 2018-01-14 | Stop reason: HOSPADM

## 2018-01-14 RX ORDER — GLYCOPYRROLATE 0.2 MG/ML
INJECTION INTRAMUSCULAR; INTRAVENOUS AS NEEDED
Status: DISCONTINUED | OUTPATIENT
Start: 2018-01-14 | End: 2018-01-14 | Stop reason: HOSPADM

## 2018-01-14 RX ORDER — NEOSTIGMINE METHYLSULFATE 1 MG/ML
INJECTION INTRAVENOUS AS NEEDED
Status: DISCONTINUED | OUTPATIENT
Start: 2018-01-14 | End: 2018-01-14 | Stop reason: HOSPADM

## 2018-01-14 RX ORDER — DIPHENHYDRAMINE HYDROCHLORIDE 50 MG/ML
12.5 INJECTION, SOLUTION INTRAMUSCULAR; INTRAVENOUS AS NEEDED
Status: DISCONTINUED | OUTPATIENT
Start: 2018-01-14 | End: 2018-01-14 | Stop reason: HOSPADM

## 2018-01-14 RX ORDER — ONDANSETRON 2 MG/ML
INJECTION INTRAMUSCULAR; INTRAVENOUS AS NEEDED
Status: DISCONTINUED | OUTPATIENT
Start: 2018-01-14 | End: 2018-01-14 | Stop reason: HOSPADM

## 2018-01-14 RX ORDER — DEXAMETHASONE SODIUM PHOSPHATE 4 MG/ML
INJECTION, SOLUTION INTRA-ARTICULAR; INTRALESIONAL; INTRAMUSCULAR; INTRAVENOUS; SOFT TISSUE AS NEEDED
Status: DISCONTINUED | OUTPATIENT
Start: 2018-01-14 | End: 2018-01-14 | Stop reason: HOSPADM

## 2018-01-14 RX ADMIN — ONDANSETRON 4 MG: 2 INJECTION INTRAMUSCULAR; INTRAVENOUS at 05:17

## 2018-01-14 RX ADMIN — SODIUM CHLORIDE, POTASSIUM CHLORIDE, SODIUM LACTATE AND CALCIUM CHLORIDE: 600; 310; 30; 20 INJECTION, SOLUTION INTRAVENOUS at 10:00

## 2018-01-14 RX ADMIN — PROPOFOL 120 MG: 10 INJECTION, EMULSION INTRAVENOUS at 10:22

## 2018-01-14 RX ADMIN — ROCURONIUM BROMIDE 5 MG: 10 INJECTION, SOLUTION INTRAVENOUS at 10:21

## 2018-01-14 RX ADMIN — DEXAMETHASONE SODIUM PHOSPHATE 8 MG: 4 INJECTION, SOLUTION INTRA-ARTICULAR; INTRALESIONAL; INTRAMUSCULAR; INTRAVENOUS; SOFT TISSUE at 10:44

## 2018-01-14 RX ADMIN — Medication 10 ML: at 23:03

## 2018-01-14 RX ADMIN — FAMOTIDINE 20 MG: 20 TABLET ORAL at 17:19

## 2018-01-14 RX ADMIN — GLYCOPYRROLATE 0.4 MG: 0.2 INJECTION INTRAMUSCULAR; INTRAVENOUS at 11:00

## 2018-01-14 RX ADMIN — POTASSIUM CHLORIDE 10 MEQ: 10 INJECTION, SOLUTION INTRAVENOUS at 08:39

## 2018-01-14 RX ADMIN — FENTANYL CITRATE 50 MCG: 50 INJECTION, SOLUTION INTRAMUSCULAR; INTRAVENOUS at 10:17

## 2018-01-14 RX ADMIN — PIPERACILLIN SODIUM AND TAZOBACTAM SODIUM 3.38 G: 3; .375 INJECTION, POWDER, LYOPHILIZED, FOR SOLUTION INTRAVENOUS at 14:07

## 2018-01-14 RX ADMIN — Medication 10 ML: at 14:07

## 2018-01-14 RX ADMIN — MIDAZOLAM HYDROCHLORIDE 2 MG: 1 INJECTION, SOLUTION INTRAMUSCULAR; INTRAVENOUS at 10:17

## 2018-01-14 RX ADMIN — LIDOCAINE HYDROCHLORIDE 60 MG: 20 INJECTION, SOLUTION EPIDURAL; INFILTRATION; INTRACAUDAL; PERINEURAL at 10:20

## 2018-01-14 RX ADMIN — HYDROMORPHONE HYDROCHLORIDE 2 MG: 2 INJECTION INTRAMUSCULAR; INTRAVENOUS; SUBCUTANEOUS at 02:15

## 2018-01-14 RX ADMIN — SODIUM CHLORIDE, SODIUM LACTATE, POTASSIUM CHLORIDE, AND CALCIUM CHLORIDE 125 ML/HR: 600; 310; 30; 20 INJECTION, SOLUTION INTRAVENOUS at 02:16

## 2018-01-14 RX ADMIN — SUCCINYLCHOLINE CHLORIDE 100 MG: 20 INJECTION INTRAMUSCULAR; INTRAVENOUS at 10:23

## 2018-01-14 RX ADMIN — SODIUM CHLORIDE, SODIUM LACTATE, POTASSIUM CHLORIDE, AND CALCIUM CHLORIDE 125 ML/HR: 600; 310; 30; 20 INJECTION, SOLUTION INTRAVENOUS at 08:43

## 2018-01-14 RX ADMIN — ONDANSETRON 4 MG: 2 INJECTION INTRAMUSCULAR; INTRAVENOUS at 02:15

## 2018-01-14 RX ADMIN — FENTANYL CITRATE 100 MCG: 50 INJECTION, SOLUTION INTRAMUSCULAR; INTRAVENOUS at 10:34

## 2018-01-14 RX ADMIN — HYDROCODONE BITARTRATE AND ACETAMINOPHEN 2 TABLET: 5; 325 TABLET ORAL at 17:19

## 2018-01-14 RX ADMIN — FENTANYL CITRATE 50 MCG: 50 INJECTION, SOLUTION INTRAMUSCULAR; INTRAVENOUS at 10:40

## 2018-01-14 RX ADMIN — SODIUM CHLORIDE, SODIUM LACTATE, POTASSIUM CHLORIDE, AND CALCIUM CHLORIDE 125 ML/HR: 600; 310; 30; 20 INJECTION, SOLUTION INTRAVENOUS at 18:39

## 2018-01-14 RX ADMIN — HYDROCODONE BITARTRATE AND ACETAMINOPHEN 2 TABLET: 5; 325 TABLET ORAL at 21:53

## 2018-01-14 RX ADMIN — PIPERACILLIN SODIUM AND TAZOBACTAM SODIUM 3.38 G: 3; .375 INJECTION, POWDER, LYOPHILIZED, FOR SOLUTION INTRAVENOUS at 05:21

## 2018-01-14 RX ADMIN — PANTOPRAZOLE SODIUM 40 MG: 40 TABLET, DELAYED RELEASE ORAL at 08:39

## 2018-01-14 RX ADMIN — HYDROMORPHONE HYDROCHLORIDE 2 MG: 2 INJECTION INTRAMUSCULAR; INTRAVENOUS; SUBCUTANEOUS at 05:18

## 2018-01-14 RX ADMIN — NEOSTIGMINE METHYLSULFATE 3 MG: 1 INJECTION INTRAVENOUS at 11:00

## 2018-01-14 RX ADMIN — ROCURONIUM BROMIDE 25 MG: 10 INJECTION, SOLUTION INTRAVENOUS at 10:28

## 2018-01-14 RX ADMIN — FENTANYL CITRATE 50 MCG: 50 INJECTION, SOLUTION INTRAMUSCULAR; INTRAVENOUS at 10:20

## 2018-01-14 RX ADMIN — ONDANSETRON 4 MG: 2 INJECTION INTRAMUSCULAR; INTRAVENOUS at 10:44

## 2018-01-14 RX ADMIN — PIPERACILLIN SODIUM AND TAZOBACTAM SODIUM 3.38 G: 3; .375 INJECTION, POWDER, LYOPHILIZED, FOR SOLUTION INTRAVENOUS at 23:03

## 2018-01-14 NOTE — ANESTHESIA PREPROCEDURE EVALUATION
Anesthetic History   No history of anesthetic complications            Review of Systems / Medical History  Patient summary reviewed, nursing notes reviewed and pertinent labs reviewed    Pulmonary  Within defined limits      Sleep apnea           Neuro/Psych   Within defined limits           Cardiovascular  Within defined limits                Exercise tolerance: >4 METS     GI/Hepatic/Renal  Within defined limits   GERD           Endo/Other  Within defined limits           Other Findings              Physical Exam    Airway  Mallampati: II    Neck ROM: normal range of motion   Mouth opening: Normal     Cardiovascular  Regular rate and rhythm,  S1 and S2 normal,  no murmur, click, rub, or gallop  Rhythm: regular  Rate: normal         Dental  No notable dental hx       Pulmonary  Breath sounds clear to auscultation               Abdominal  GI exam deferred       Other Findings            Anesthetic Plan    ASA: 2  Anesthesia type: general          Induction: Intravenous  Anesthetic plan and risks discussed with: Patient      Uneventful ERCP several days ago

## 2018-01-14 NOTE — PROGRESS NOTES
SURGERY PROGRESS NOTE      Admit Date: 1/10/2018    POD Day of Surgery    Procedure: Procedure(s):  CHOLECYSTECTOMY LAPAROSCOPIC      Subjective:     Patient feels much better. Denies pain and nausea. Would like gallbladder removed today. Dr Josefina Schulz prepared patient for surgery today      Objective:     Visit Vitals    /82 (BP 1 Location: Left arm, BP Patient Position: At rest;Supine)    Pulse 84    Temp 99 °F (37.2 °C)    Resp 16    Ht 5' 11\" (1.803 m)    Wt 190 lb (86.2 kg)    SpO2 98%    BMI 26.5 kg/m2        Temp (24hrs), Av.2 °F (37.3 °C), Min:98.2 °F (36.8 °C), Max:100.1 °F (37.8 °C)         1901 -  0700  In: 9232 [P.O.:525; I.V.:3625]  Out: -     Physical Exam:    General:  alert, cooperative, no distress, appears stated age   Abdomen: soft, bowel sounds active, non-tender, distended           Lab Results  Component Value Date/Time   WBC 8.4 2018 12:26 AM   WBC 8.2 2018 12:26 AM   HGB 12.5 2018 12:26 AM   HGB 12.7 2018 12:26 AM   Hemoglobin (POC) 14.3 2010 02:34 PM   HCT 36.1 2018 12:26 AM   HCT 35.7 2018 12:26 AM   Hematocrit (POC) 42 2010 02:34 PM   PLATELET 699  12:26 AM   PLATELET 430 10/95/07 12:26 AM   MCV 90.5 2018 12:26 AM   MCV 90.6 2018 12:26 AM     Lab Results  Component Value Date/Time   ALT (SGPT) 126 2018 12:26 AM   AST (SGOT) 43 2018 12:26 AM   Alk.  phosphatase 75 2018 12:26 AM   Bilirubin, direct 3.9 2018 04:24 AM   Bilirubin, total 3.7 2018 12:26 AM   Albumin 2.8 2018 12:26 AM   Protein, total 6.3 2018 12:26 AM   Ammonia 53 2013 08:40 PM   INR 1.1 01/10/2018 12:04 PM   Prothrombin time 10.6 01/10/2018 12:04 PM   PLATELET 223  12:26 AM   PLATELET 706  12:26 AM   Hepatitis B surface Ag <0.10 01/10/2018 09:25 AM         Assessment:     Principal Problem:    Choledocholithiasis with acute cholecystitis with obstruction (1/10/2018)    Active Problems:    Elevated LFTs (1/10/2018)      Hyperlipidemia (1/10/2018)      GERD (gastroesophageal reflux disease) (1/10/2018)      Alcohol use (1/10/2018)    Pancreatitis resolved. Ready for cholecystectomy    Plan: To OR for cholecystectomy. Discussed aspects of surgical intervention, methods, risks (including by not limited to infection, bleeding, retained gallstones in the abdominal cavity and/or the main bile ducts, and injury to adjacent structures including the biliary system, common bile duct, and intestines.)  The patient understands the risks, any and all questions were answered to the patient's satisfaction. 3. Patient does wish to proceed with surgery.

## 2018-01-14 NOTE — ROUTINE PROCESS
Bedside shift change report given to Steven Ruffin RN (oncoming nurse) by Erika Garza. Lori Baker   (offgoing nurse). Report included the following information SBAR, Kardex and MAR.

## 2018-01-14 NOTE — ROUTINE PROCESS
TRANSFER - OUT REPORT:    Verbal report given to TORRIE Latham(name) on Meri Kelsey  being transferred to 509(unit) for routine post - op       Report consisted of patients Situation, Background, Assessment and   Recommendations(SBAR). Information from the following report(s) SBAR, Kardex, ED Summary, Intake/Output, MAR and Recent Results was reviewed with the receiving nurse. Lines:   Peripheral IV 01/12/18 Left Arm (Active)   Site Assessment Clean, dry, & intact 1/14/2018  8:38 AM   Phlebitis Assessment 0 1/14/2018  8:38 AM   Infiltration Assessment 0 1/14/2018  8:38 AM   Dressing Status Clean, dry, & intact 1/14/2018  8:38 AM   Dressing Type Transparent 1/14/2018  8:38 AM   Hub Color/Line Status Pink; Infusing 1/14/2018  8:38 AM   Action Taken Open ports on tubing capped 1/14/2018  8:38 AM   Alcohol Cap Used Yes 1/14/2018  8:38 AM        Opportunity for questions and clarification was provided.       Patient transported with:   Registered Nurse

## 2018-01-14 NOTE — PROGRESS NOTES
[facility] MD  [phone]           GI PROGRESS NOTE      NAME: Emilia Homans   :  1955   MRN:  [id number]       Subjective:   No issues overnight. Pt had cholecystectomy done earlier today        VITALS:   Last 24hrs VS reviewed since prior progress note. Most recent are:  Visit Vitals    /89 (BP 1 Location: Right arm, BP Patient Position: At rest)    Pulse 89    Temp 99.6 °F (37.6 °C)    Resp 16    Ht 5' 11\" (1.803 m)    Wt 86.2 kg (190 lb)    SpO2 96%    BMI 26.5 kg/m2       Intake/Output Summary (Last 24 hours) at 18 1242  Last data filed at 18 1107   Gross per 24 hour   Intake             3900 ml   Output               10 ml   Net             3890 ml     PHYSICAL EXAM:  General: Alert, in no acute distress    HEENT: Anicteric sclerae. Lungs:  CTA Bilaterally. Heart:  Regular  rhythm,    Abdomen: Soft, Non distended, Non tender.  (+)Bowel sounds, no HSM  Extremities: No c/c/e  Neurologic:  CN 2-12 gi, Alert and oriented X 3. No acute neurological distress   Psych:   Good insight. Not anxious nor agitated.     Lab Data Reviewed:   Recent Labs      186  18   WBC  8.4  8.2  11.5*   HGB  12.5  12.7  13.7   HCT  36.1*  35.7*  39.6   PLT  109*  113*  116*     Recent Labs      186  18   NA  138  142   K  3.4*  3.8   CL  102  105   CO2  28  28   BUN  10  14   CREA  1.03  1.09   GLU  107*  82   CA  8.8  8.9     Recent Labs      186  18   SGOT  43*  48*   AP  75  82   TP  6.3*  6.3*   ALB  2.8*  2.9*   GLOB  3.5  3.4   LPSE  175  296       ________________________________________________________________________  Patient Active Problem List   Diagnosis Code    Memory loss R41.3    Elevated LFTs R79.89    Choledocholithiasis with acute cholecystitis with obstruction K80.41    Hyperlipidemia E78.5    GERD (gastroesophageal reflux disease) K21.9    Alcohol use Z78.9        Assessment:   · CBD stones s/p ERCP stone removal and cholecystectomy  · Pancreatitis-mild and resolved   Plan:   · Continue monitoring  · Diet per surgery  · Possible d/c in am  · Dr Potter Men to follow in am     Signed By: Julia Sorensen MD     1/14/2018  12:42 PM

## 2018-01-14 NOTE — PROGRESS NOTES
Daily Progress Note: 1/14/2018  Manjit Beck MD    Assessment/Plan:   Choledocholithiasis with acute cholecystitis with obstruction --  -s/p ERCP with sphincterotomy   -continue IV antibiotics and IVF   -NPO per surgery this AM,  Trial of liquids 1/13 went ok  -plan for lap rehana this AM per surg    Pancreatitis following ERCP with lipase >3000 -- lipase has now normalized  -pain improved       Elevated LFTs (1/10/2018) - 2/2 aforementioned   -monitor LFT's - improving    Elevated BP -- monitor. Will stop IVF when able to and use prn clonidine for now       Hyperlipidemia (1/10/2018)  -resume home meds at discharge        GERD (gastroesophageal reflux disease)  -continue PPI and famotidine while in house        Alcohol use (1/10/2018) - denies h/o withdrawals. Drinks 1-2/day. Relatively low risk for withdrawal but will monitor   -CIWA protocol   --no withdrawal    DVT proph - hold lovenox today due to surgery       Problem List:  Problem List as of 1/14/2018  Date Reviewed: 1/10/2018          Codes Class Noted - Resolved    Elevated LFTs ICD-10-CM: R79.89  ICD-9-CM: 790.6  1/10/2018 - Present        * (Principal)Choledocholithiasis with acute cholecystitis with obstruction ICD-10-CM: K80.41  ICD-9-CM: 574.31  1/10/2018 - Present        Hyperlipidemia ICD-10-CM: E78.5  ICD-9-CM: 272.4  1/10/2018 - Present        GERD (gastroesophageal reflux disease) ICD-10-CM: K21.9  ICD-9-CM: 530.81  1/10/2018 - Present        Alcohol use ICD-10-CM: Z78.9  ICD-9-CM: V49.89  1/10/2018 - Present        Memory loss ICD-10-CM: R41.3  ICD-9-CM: 780.93  5/31/2013 - Present              HPI:   Mr. Ant Davidson is a 63 y.o.  male with PMH of HAWA not on CPAP, GERD, XOL and alcohol use admitted for RUQ pain and transaminitis. He was evaluated in the ED and underwent a CT ab/pelvis showing \"Cholelithiasis and pericholecystic fluid highly worrisome for acute cholecystitis. Distal common bile duct calculi\".  He subsequently was taken for ERCP by GI and is currently on antibiotics with plans for lap rehana in the AM. Pt currently without ab pain. No N/V. (Dr Kraig Encarnacion)    18: Feeling much worse. Had ERCP and now with pancreatitis. Had uncontrolled pain last night. His Dilaudid has been increased and will add O2 as he has a history of HAWA and snoring. Lipase is >3000. Bili and LFTs higher. Family disappointed that he could not have surgery this am but understand why. Being held in ER as there are no beds available. :  Still has epigastric and mid back pain but somewhat controlled with Dilaudid. No N/V at this moment. He wants to try some liquids. LFTs down this AM - bili down to 4.7 from 5.2. WBC up. No temps. Lipase down to 1400 from >3000 yesterday. : Continues to improve. Continued lower back pain that is similar to what he gets when in the bed or sitting long term but walking in [place] and moving better in the bed has helped. Still with some abd. Pain but overall improved. Denies nausea, vomiting. No CP, SOB. Not hungry. : Pain improved. Tolerated liquids yesterday though limited his intake because he feels full due to constipation. No flatus. Denies nausea, CP, SOB. Review of Systems:   A comprehensive review of systems was negative except for that written in the HPI. Objective:   Physical Exam:     Visit Vitals    BP (!) 147/95    Pulse 83    Temp 98.6 °F (37 °C)    Resp 16    Ht 5' 11\" (1.803 m)    Wt 86.2 kg (190 lb)    SpO2 98%    BMI 26.5 kg/m2    O2 Flow Rate (L/min): 2 l/min O2 Device: Nasal cannula    Temp (24hrs), Av.2 °F (37.3 °C), Min:98.2 °F (36.8 °C), Max:100.1 °F (37.8 °C)         1901 -  0700  In: 9725 [P.O.:525; I.V.:3625]  Out: -     General:  Awake, alert, cooperative   Head:  Normocephalic, without obvious abnormality, atraumatic. Eyes:   PERRL, EOMs intact. Nose: Nares normal. Septum midline.     Throat: Lips, mucosa, and tongue normal. Teeth and gums normal.   Neck: Supple, symmetrical, trachea midline   Back:   Symmetric, no curvature. +ttp lower lumbar muscle bilaterally   Lungs:   Clear to auscultation bilaterally. Heart:  Regular rate and rhythm, S1, S2 normal, no murmur, click, rub or gallop. Abdomen:   Slightly distended, BS normal, +ttp diffuse with no rebound or guarding, soft   Extremities: Extremities normal, atraumatic, no cyanosis or edema. No calf tenderness or cords. Pulses: 2+ and symmetric all extremities. Skin: Jaundice present,  No rashes or lesions   Neurologic: CNII-XII intact. Alert and oriented X 3. Data Review:       Recent Days:  Recent Labs      01/14/18 0026  01/13/18 0458  01/12/18   0355   WBC  8.4  8.2  11.5*  15.6*   HGB  12.5  12.7  13.7  16.1   HCT  36.1*  35.7*  39.6  44.2   PLT  109*  113*  116*  124*     Recent Labs      01/14/18 0026  01/13/18 0458  01/12/18   0355   NA  138  142  142   K  3.4*  3.8  3.7   CL  102  105  107   CO2  28  28  26   GLU  107*  82  80   BUN  10  14  10   CREA  1.03  1.09  1.10   CA  8.8  8.9  8.9   MG   --   1.9  1.8   ALB  2.8*  2.9*  3.5   TBILI  3.7*  3.9*  4.7*   SGOT  43*  48*  114*   ALT  126*  166*  290*     Lab Results   Component Value Date/Time    Lipase 175 01/14/2018 12:26 AM     No results for input(s): PH, PCO2, PO2, HCO3, FIO2 in the last 72 hours. 24 Hour Results:  Recent Results (from the past 24 hour(s))   CBC WITH AUTOMATED DIFF    Collection Time: 01/14/18 12:26 AM   Result Value Ref Range    WBC 8.4 4.1 - 11.1 K/uL    RBC 3.99 (L) 4.10 - 5.70 M/uL    HGB 12.5 12.1 - 17.0 g/dL    HCT 36.1 (L) 36.6 - 50.3 %    MCV 90.5 80.0 - 99.0 FL    MCH 31.3 26.0 - 34.0 PG    MCHC 34.6 30.0 - 36.5 g/dL    RDW 12.4 11.5 - 14.5 %    PLATELET 528 (L) 295 - 400 K/uL    NEUTROPHILS 84 (H) 32 - 75 %    LYMPHOCYTES 7 (L) 12 - 49 %    MONOCYTES 7 5 - 13 %    EOSINOPHILS 2 0 - 7 %    BASOPHILS 0 0 - 1 %    ABS. NEUTROPHILS 7.1 1.8 - 8.0 K/UL    ABS. LYMPHOCYTES 0.6 (L) 0.8 - 3.5 K/UL    ABS. MONOCYTES 0.6 0.0 - 1.0 K/UL    ABS. EOSINOPHILS 0.1 0.0 - 0.4 K/UL    ABS. BASOPHILS 0.0 0.0 - 0.1 K/UL   CBC WITH AUTOMATED DIFF    Collection Time: 01/14/18 12:26 AM   Result Value Ref Range    WBC 8.2 4.1 - 11.1 K/uL    RBC 3.94 (L) 4.10 - 5.70 M/uL    HGB 12.7 12.1 - 17.0 g/dL    HCT 35.7 (L) 36.6 - 50.3 %    MCV 90.6 80.0 - 99.0 FL    MCH 32.2 26.0 - 34.0 PG    MCHC 35.6 30.0 - 36.5 g/dL    RDW 12.4 11.5 - 14.5 %    PLATELET 396 (L) 396 - 400 K/uL    NEUTROPHILS 83 (H) 32 - 75 %    LYMPHOCYTES 7 (L) 12 - 49 %    MONOCYTES 9 5 - 13 %    EOSINOPHILS 1 0 - 7 %    BASOPHILS 0 0 - 1 %    ABS. NEUTROPHILS 6.8 1.8 - 8.0 K/UL    ABS. LYMPHOCYTES 0.5 (L) 0.8 - 3.5 K/UL    ABS. MONOCYTES 0.7 0.0 - 1.0 K/UL    ABS. EOSINOPHILS 0.1 0.0 - 0.4 K/UL    ABS. BASOPHILS 0.0 0.0 - 0.1 K/UL   METABOLIC PANEL, COMPREHENSIVE    Collection Time: 01/14/18 12:26 AM   Result Value Ref Range    Sodium 138 136 - 145 mmol/L    Potassium 3.4 (L) 3.5 - 5.1 mmol/L    Chloride 102 97 - 108 mmol/L    CO2 28 21 - 32 mmol/L    Anion gap 8 5 - 15 mmol/L    Glucose 107 (H) 65 - 100 mg/dL    BUN 10 6 - 20 MG/DL    Creatinine 1.03 0.70 - 1.30 MG/DL    BUN/Creatinine ratio 10 (L) 12 - 20      GFR est AA >60 >60 ml/min/1.73m2    GFR est non-AA >60 >60 ml/min/1.73m2    Calcium 8.8 8.5 - 10.1 MG/DL    Bilirubin, total 3.7 (H) 0.2 - 1.0 MG/DL    ALT (SGPT) 126 (H) 12 - 78 U/L    AST (SGOT) 43 (H) 15 - 37 U/L    Alk.  phosphatase 75 45 - 117 U/L    Protein, total 6.3 (L) 6.4 - 8.2 g/dL    Albumin 2.8 (L) 3.5 - 5.0 g/dL    Globulin 3.5 2.0 - 4.0 g/dL    A-G Ratio 0.8 (L) 1.1 - 2.2     LIPASE    Collection Time: 01/14/18 12:26 AM   Result Value Ref Range    Lipase 175 73 - 393 U/L     Medications reviewed  Current Facility-Administered Medications   Medication Dose Route Frequency    [START ON 1/15/2018] enoxaparin (LOVENOX) injection 40 mg  40 mg SubCUTAneous Q24H    cloNIDine HCl (CATAPRES) tablet 0.1 mg  0.1 mg Oral Q6H PRN    pantoprazole (PROTONIX) tablet 40 mg  40 mg Oral DAILY    famotidine (PEPCID) tablet 20 mg  20 mg Oral QPM    lactated Ringers infusion  125 mL/hr IntraVENous CONTINUOUS    influenza vaccine 2017-18 (3 yrs+)(PF) (FLUZONE QUAD/FLUARIX QUAD) injection 0.5 mL  0.5 mL IntraMUSCular PRIOR TO DISCHARGE    piperacillin-tazobactam (ZOSYN) 3.375 g in 0.9% sodium chloride (MBP/ADV) 100 mL  3.375 g IntraVENous Q8H    HYDROmorphone (DILAUDID) injection 2 mg  2 mg IntraVENous Q3H PRN    prochlorperazine (COMPAZINE) injection 10 mg  10 mg IntraVENous Q6H PRN    HYDROmorphone (DILAUDID) injection 0.5 mg  0.5 mg IntraVENous Q3H PRN    sodium chloride (NS) flush 5-10 mL  5-10 mL IntraVENous Q8H    sodium chloride (NS) flush 5-10 mL  5-10 mL IntraVENous PRN    naloxone (NARCAN) injection 0.4 mg  0.4 mg IntraVENous PRN    ondansetron (ZOFRAN) injection 4 mg  4 mg IntraVENous Q4H PRN    iopamidol (ISOVUE 300) 61 % contrast injection 50 mL  50 mL IntraCATHeter MULTIPLE DOSE GIVEN    indomethacin (INDOCIN) rectal suppository 100 mg  100 mg Rectal ONCE PRN    glucagon (GLUCAGEN) injection 1 mg  1 mg IntraVENous PRN    HYDROcodone-acetaminophen (NORCO) 5-325 mg per tablet 1 Tab  1 Tab Oral Q6H PRN    LORazepam (ATIVAN) tablet 2 mg  2 mg Oral Q1H PRN    LORazepam (ATIVAN) tablet 4 mg  4 mg Oral Q1H PRN     Care Plan discussed with: Patient/Family     Maverick Shoemaker MD

## 2018-01-14 NOTE — PROGRESS NOTES
notified that patient's blood pressure is trending upwards. Order given for prn blood pressure medication to be given for systolic blood pressure greater than 160.

## 2018-01-14 NOTE — OP NOTES
Laparoscopic Cholecystectomy    Indications: This patient presents with a symptomatic gallbladder disease and will undergo laparoscopic cholecystecomy. Proceedure: laparoscopic cholecystectomy     Pre-operative Diagnosis: Calculus of bile duct with acute cholecystitis without mention of obstruction    Post-operative Diagnosis:  Calculus of gallbladder with other cholecystitis, without mention of obstruction    Surgeon: Luiz Angela MD     Anesthesia: General endotracheal anesthesia    ASA Class: 1      Assistant:  Surgeon(s):  Luiz Angela MD    Procedure Details   The patient was seen again in the Holding Room. The risks, benefits, complications, treatment options, and expected outcomes were discussed with the patient. The possibilities of reaction to medication, pulmonary aspiration, perforation of viscus, bleeding, recurrent infection, finding a normal gallbladder, the need for additional procedures, failure to diagnose a condition, the possible need to convert to an open procedure, and creating a complication requiring transfusion or operation were discussed with the patient. The patient and/or family concurred with the proposed plan, giving informed consent. The patient was taken to Operating Room, identified as Mabel Wright and the procedure verified as Laparoscopic Cholecystectomy with possible Intraoperative Cholangiograms. A Time Out was held and the above information confirmed. Prior to the induction of general anesthesia,  antibiotic prophylaxis was administered. General endotracheal anesthesia was then administered and tolerated well. After the induction, the abdomen was prepped in the usual sterile fashion. The abdomen was entered in the right upper quadrant in the midclavicular line just below the costal margin. At this site, 3 mL of 0.5% Marcaine was injected in the subcutaneous space. A 5-mm incision made with an 11-blade scalpel.  Then a 5-mm Xcel trocar containing 5-mm 0-degree laparoscope was used to dissect through the layers of the abdominal wall under direct laparoscopic vision. Entry into the abdomen was confirmed visually. Once within the abdomen, insufflation was provided through this trocar to a pressure of 15 mmHg. The patient tolerated insufflation well and there were no apparent complications. A 360-degree evaluation of the abdomen was then performed from this trocar  site. There were no peritoneal implants identified. There were no abnormalities on the surface of the liver. Attention was then focused at the umbilicus. Marcaine plain 0.5% 3 mL was injected in the subcutaneous space, as well as the preperitoneal space. A 12-mm curvilinear incision was made at the base of the umbilicus, and then a 39-QW Xcel trocar was inserted under direct laparoscopic vision into the abdominal cavity. The camera was then switched to this trocar position. The patient was then placed in reverse Trendelenburg with right side up. Attention was focused at the right upper quadrant, and 2 additional trocars were placed. One 5-mm trocar was placed in the epigastrium just below the xyphyoid The third 5-mm trocar was placed in the anterior axillary line just below the costal margin. At both sites, 3 mL of 0.5% Marcaine was injected into the skin and the preperitoneal space, a 5-mm incision made, and then the 5-mm trocar inserted under direct laparoscopic vision. The fundus of the gallbladder was then grasped and retracted over the dome of the liver. The infundibulum was grasped and retracted to the right and inferiorly. Blunt dissection was used to dissect out the space between the infundibulum and the gallbladder bed, and then blunt dissection was used to dissect out the cystic duct and  cystic artery. A critical view was obtained where these were the only 2 structures entering the gallbladder. Once this critical view was obtained, a clip was placed at the base of the infundibulum. Two clips were placed on the proximal cystic duct and then the choledochotomy completed with laparoscopic scissors. The artery was clipped and divided in a similar fashion. The infundibulum of the gallbladder was then retracted towards the anterior abdominal wall and the gallbladder removed from the gallbladder fossa with electrocautery. The gallbladder was then placed over the right lobe of the liver. An Endocatch bag was then inserted through the umbilical trochar. The gallbladder was then placed in the EndoCatch bag and removed through the umbilical trocar site. The gallbladder was then sent to Pathology. Next, the right upper quadrant was inspected. The gallbladder fossa appeared dry. There was no evidence of any bleeding. The cystic duct remnant had 2 clips across it, and an Endoloop, with no evidence of any leakage of bile. The cystic artery remnant was inspected. It had 2 clips across it with no evidence of any bleeding. The right upper quadrant was then irrigated with 300 mL of normal saline. The irrigation came back  clear. Attention was then focused on the umbilical trocar site, and the fascia at  this umbilical trocar site was closed with a transfascial sutures of 0  Vicryl. This was done using an Endo Close device under direct laparoscopic  vision. Once this suture was tied, there was no loss of pneumoperitoneum through the umbilical trocar site and no palpable defect, indicating adequate closure of the trocar site. Pneumoinsufflation was then vented through the 5-mm trocar sites. The trocars were then removed and the skin at all trocar sites closed with 4-0 Monocryl and Dermabond. The patient was extubated in the room and taken to the [id number] [place] [address]  Unit in stable condition. Instrument, sponge, and needle counts were correct x2.     Findings:  Cholecystitis with Cholelithiasis    Estimated Blood Loss:  Minimal           Drains: none             Specimens: Gallbladder Complications:  None; patient tolerated the procedure well. Disposition: PACU - hemodynamically stable.            Condition: stable    Attending Attestation: I was present and scrubbed for the entire procedure

## 2018-01-15 VITALS
WEIGHT: 190 LBS | HEIGHT: 71 IN | HEART RATE: 65 BPM | OXYGEN SATURATION: 96 % | RESPIRATION RATE: 17 BRPM | DIASTOLIC BLOOD PRESSURE: 87 MMHG | SYSTOLIC BLOOD PRESSURE: 143 MMHG | BODY MASS INDEX: 26.6 KG/M2 | TEMPERATURE: 98.2 F

## 2018-01-15 LAB
ALBUMIN SERPL-MCNC: 2.7 G/DL (ref 3.5–5)
ALBUMIN/GLOB SERPL: 0.7 {RATIO} (ref 1.1–2.2)
ALP SERPL-CCNC: 67 U/L (ref 45–117)
ALT SERPL-CCNC: 126 U/L (ref 12–78)
ANION GAP SERPL CALC-SCNC: 9 MMOL/L (ref 5–15)
AST SERPL-CCNC: 65 U/L (ref 15–37)
BASOPHILS # BLD: 0 K/UL (ref 0–0.1)
BASOPHILS NFR BLD: 0 % (ref 0–1)
BILIRUB DIRECT SERPL-MCNC: 1.3 MG/DL (ref 0–0.2)
BILIRUB INDIRECT SERPL-MCNC: 0.5 MG/DL (ref 0–1.1)
BILIRUB SERPL-MCNC: 1.8 MG/DL (ref 0.2–1)
BILIRUB SERPL-MCNC: 1.8 MG/DL (ref 0.2–1)
BUN SERPL-MCNC: 12 MG/DL (ref 6–20)
BUN/CREAT SERPL: 13 (ref 12–20)
CALCIUM SERPL-MCNC: 8.9 MG/DL (ref 8.5–10.1)
CHLORIDE SERPL-SCNC: 105 MMOL/L (ref 97–108)
CO2 SERPL-SCNC: 27 MMOL/L (ref 21–32)
CREAT SERPL-MCNC: 0.93 MG/DL (ref 0.7–1.3)
EOSINOPHIL # BLD: 0 K/UL (ref 0–0.4)
EOSINOPHIL NFR BLD: 0 % (ref 0–7)
ERYTHROCYTE [DISTWIDTH] IN BLOOD BY AUTOMATED COUNT: 12.2 % (ref 11.5–14.5)
GLOBULIN SER CALC-MCNC: 3.7 G/DL (ref 2–4)
GLUCOSE SERPL-MCNC: 132 MG/DL (ref 65–100)
HCT VFR BLD AUTO: 34.4 % (ref 36.6–50.3)
HGB BLD-MCNC: 12.1 G/DL (ref 12.1–17)
LIPASE SERPL-CCNC: 95 U/L (ref 73–393)
LYMPHOCYTES # BLD: 0.4 K/UL (ref 0.8–3.5)
LYMPHOCYTES NFR BLD: 8 % (ref 12–49)
MCH RBC QN AUTO: 31.3 PG (ref 26–34)
MCHC RBC AUTO-ENTMCNC: 35.2 G/DL (ref 30–36.5)
MCV RBC AUTO: 88.9 FL (ref 80–99)
MONOCYTES # BLD: 0.5 K/UL (ref 0–1)
MONOCYTES NFR BLD: 11 % (ref 5–13)
NEUTS SEG # BLD: 3.7 K/UL (ref 1.8–8)
NEUTS SEG NFR BLD: 81 % (ref 32–75)
PLATELET # BLD AUTO: 115 K/UL (ref 150–400)
POTASSIUM SERPL-SCNC: 3.7 MMOL/L (ref 3.5–5.1)
PROT SERPL-MCNC: 6.4 G/DL (ref 6.4–8.2)
RBC # BLD AUTO: 3.87 M/UL (ref 4.1–5.7)
SODIUM SERPL-SCNC: 141 MMOL/L (ref 136–145)
WBC # BLD AUTO: 4.6 K/UL (ref 4.1–11.1)

## 2018-01-15 PROCEDURE — 74011250636 HC RX REV CODE- 250/636: Performed by: SURGERY

## 2018-01-15 PROCEDURE — 83690 ASSAY OF LIPASE: CPT | Performed by: SURGERY

## 2018-01-15 PROCEDURE — 85025 COMPLETE CBC W/AUTO DIFF WBC: CPT | Performed by: FAMILY MEDICINE

## 2018-01-15 PROCEDURE — 74011250637 HC RX REV CODE- 250/637: Performed by: INTERNAL MEDICINE

## 2018-01-15 PROCEDURE — 80053 COMPREHEN METABOLIC PANEL: CPT | Performed by: FAMILY MEDICINE

## 2018-01-15 PROCEDURE — 82248 BILIRUBIN DIRECT: CPT | Performed by: SURGERY

## 2018-01-15 PROCEDURE — 74011000258 HC RX REV CODE- 258: Performed by: SURGERY

## 2018-01-15 PROCEDURE — 36415 COLL VENOUS BLD VENIPUNCTURE: CPT | Performed by: SURGERY

## 2018-01-15 PROCEDURE — 74011250636 HC RX REV CODE- 250/636: Performed by: FAMILY MEDICINE

## 2018-01-15 RX ORDER — HYDROCODONE BITARTRATE AND ACETAMINOPHEN 5; 325 MG/1; MG/1
TABLET ORAL
Qty: 30 TAB | Refills: 0 | Status: SHIPPED | OUTPATIENT
Start: 2018-01-15

## 2018-01-15 RX ADMIN — SODIUM CHLORIDE, SODIUM LACTATE, POTASSIUM CHLORIDE, AND CALCIUM CHLORIDE 125 ML/HR: 600; 310; 30; 20 INJECTION, SOLUTION INTRAVENOUS at 01:51

## 2018-01-15 RX ADMIN — HYDROMORPHONE HYDROCHLORIDE 0.5 MG: 2 INJECTION INTRAMUSCULAR; INTRAVENOUS; SUBCUTANEOUS at 01:43

## 2018-01-15 RX ADMIN — ENOXAPARIN SODIUM 40 MG: 40 INJECTION SUBCUTANEOUS at 09:32

## 2018-01-15 RX ADMIN — Medication 10 ML: at 06:32

## 2018-01-15 RX ADMIN — PANTOPRAZOLE SODIUM 40 MG: 40 TABLET, DELAYED RELEASE ORAL at 09:32

## 2018-01-15 RX ADMIN — PIPERACILLIN SODIUM AND TAZOBACTAM SODIUM 3.38 G: 3; .375 INJECTION, POWDER, LYOPHILIZED, FOR SOLUTION INTRAVENOUS at 06:31

## 2018-01-15 NOTE — ROUTINE PROCESS
Bedside and Verbal shift change report given to Shilpa Romero RN, RN  (oncoming nurse) by Saad Barrett RN  (offgoing nurse). Report included the following information SBAR, Kardex, Intake/Output, MAR and Recent Results.

## 2018-01-15 NOTE — DISCHARGE SUMMARY
Physician Discharge Summary     Patient ID:    Jaswant Larson  [id number]  63 y.o.  1955  Bibi Couch MD    Admit date: 1/10/2018  Discharge date and time: 1/15/2018  Admission Diagnoses: Elevated LFTs;CBD stone;COMMON BILE DUCT STONES;Elevated LF*  Chronic Diagnoses:    Problem List as of 1/15/2018  Date Reviewed: 1/10/2018          Codes Class Noted - Resolved    Elevated LFTs ICD-10-CM: R79.89  ICD-9-CM: 790.6  1/10/2018 - Present        * (Principal)Choledocholithiasis with acute cholecystitis with obstruction ICD-10-CM: K80.41  ICD-9-CM: 574.31  1/10/2018 - Present        Hyperlipidemia ICD-10-CM: E78.5  ICD-9-CM: 272.4  1/10/2018 - Present        GERD (gastroesophageal reflux disease) ICD-10-CM: K21.9  ICD-9-CM: 530.81  1/10/2018 - Present        Alcohol use ICD-10-CM: Z78.9  ICD-9-CM: V49.89  1/10/2018 - Present        Memory loss ICD-10-CM: R41.3  ICD-9-CM: 780.93  5/31/2013 - Present            Discharge Medications:   Current Discharge Medication List      START taking these medications    Details   HYDROcodone-acetaminophen (NORCO) 5-325 mg per tablet 1 to 2 tablets every 4 hours as needed for pain  Qty: 30 Tab, Refills: 0    Associated Diagnoses: Choledocholithiasis with acute cholecystitis with obstruction         CONTINUE these medications which have NOT CHANGED    Details   tadalafil (CIALIS) 5 mg tablet Take 5 mg by mouth daily. fish oil-omega-3 fatty acids (FISH OIL) 340-1,000 mg capsule Take 1 Cap by mouth two (2) times a day. omeprazole (PRILOSEC) 20 mg capsule Take 20 mg by mouth daily. OTHER,NON-FORMULARY, BERNA Root : 2 daily      aspirin 81 mg chewable tablet Take 81 mg by mouth nightly. ranitidine (ZANTAC) 150 mg tablet Take 150 mg by mouth every evening. fenofibrate nanocrystallized (TRICOR) 145 mg tablet Take 145 mg by mouth nightly. Indications: hypertriglyceridemia           Follow up Care:    1.  Bibi Couch MD with in 1 weeks  2.  specialists as directed. Diet:  Low fat, Low cholesterol  Disposition:  Home. Advanced Directive:  Discharge Exam:  [See today's progress note.]  CONSULTATIONS: General Surgery, GI    Significant Diagnostic Studies:   Recent Labs      01/15/18   0529  01/14/18   0026   WBC  4.6  8.4  8.2   HGB  12.1  12.5  12.7   HCT  34.4*  36.1*  35.7*   PLT  115*  109*  113*     Recent Labs      01/15/18   0529  01/14/18   0026  01/13/18 0458   NA  141  138  142   K  3.7  3.4*  3.8   CL  105  102  105   CO2  27  28  28   BUN  12  10  14   CREA  0.93  1.03  1.09   GLU  132*  107*  82   CA  8.9  8.8  8.9   MG   --    --   1.9     Recent Labs      01/15/18   0529  01/14/18   0026  01/13/18   0458   SGOT  65*  43*  48*   ALT  126*  126*  166*   AP  67  75  82   TBILI  1.8*  1.8*  3.7*  3.9*   TP  6.4  6.3*  6.3*   ALB  2.7*  2.8*  2.9*   GLOB  3.7  3.5  3.4   LPSE  95  175  296     Lab Results   Component Value Date/Time    TSH 1.77 05/31/2013 08:40 PM     HOSPITAL COURSE & TREATMENT RENDERED:   1. See today's progress note:    Daily Progress Note and Discharge Note: 1/15/2018  Christie Christiansen MD         Assessment/Plan:   Choledocholithiasis with acute cholecystitis with obstruction --  -s/p ERCP with sphincterotomy 1/10/18 by Dr Juan Velazquez  -advanced diet as tolerated   - s/p Lap Cholecystectomy 1/14 by Dr Johnnie Maloney  -Follow up outpt with PCP later this wk and with Surg as they direct     Pancreatitis following ERCP with lipase >3000 -- lipase has now normalized  -pain improved        Elevated LFTs (1/10/2018) - 2/2 aforementioned   -monitor LFT's outpt - improving     Elevated BP -- monitor. Recheck outpt. With PCP        Hyperlipidemia (1/10/2018)  -resume home meds at discharge         GERD (gastroesophageal reflux disease)  -continue PPI and famotidine while in house         Alcohol use (1/10/2018) - denies h/o withdrawals. Drinks 1-2/day.  Relatively low risk for withdrawal but will monitor   --no withdrawal      Problem List:  Problem List as of 1/15/2018  Date Reviewed: 1/10/2018             Codes Class Noted - Resolved     Elevated LFTs ICD-10-CM: R79.89  ICD-9-CM: 790.6   1/10/2018 - Present           * (Principal)Choledocholithiasis with acute cholecystitis with obstruction ICD-10-CM: K80.41  ICD-9-CM: 574.31   1/10/2018 - Present           Hyperlipidemia ICD-10-CM: E78.5  ICD-9-CM: 272.4   1/10/2018 - Present           GERD (gastroesophageal reflux disease) ICD-10-CM: K21.9  ICD-9-CM: 530.81   1/10/2018 - Present           Alcohol use ICD-10-CM: Z78.9  ICD-9-CM: V49.89   1/10/2018 - Present           Memory loss ICD-10-CM: R41.3  ICD-9-CM: 780.93   5/31/2013 - Present                  HPI:   Mr. Reeves is a 63 y.o.   male with PMH of HAWA not on CPAP, GERD, XOL and alcohol use admitted for RUQ pain and transaminitis. He was evaluated in the ED and underwent a CT ab/pelvis showing \"Cholelithiasis and pericholecystic fluid highly worrisome for acute cholecystitis. Distal common bile duct calculi\". He subsequently was taken for ERCP by GI and is currently on antibiotics with plans for lap rehana in the AM. Pt currently without ab pain. No N/V. (Dr Melo Porter)     1/11/18: Feeling much worse. Had ERCP and now with pancreatitis. Had uncontrolled pain last night. His Dilaudid has been increased and will add O2 as he has a history of HAWA and snoring. Lipase is >3000. Bili and LFTs higher. Family disappointed that he could not have surgery this am but understand why. Being held in ER as there are no beds available.       1/12:  Still has epigastric and mid back pain but somewhat controlled with Dilaudid. No N/V at this moment. He wants to try some liquids. LFTs down this AM - bili down to 4.7 from 5.2. WBC up. No temps. Lipase down to 1400 from >3000 yesterday.        1/13: Continues to improve.   Continued lower back pain that is similar to what he gets when in the bed or sitting long term but walking in [place] and moving better in the bed has helped. Still with some abd. Pain but overall improved. Denies nausea, vomiting. No CP, SOB. Not hungry.     : Pain improved. Tolerated liquids yesterday though limited his intake because he feels full due to constipation. No flatus. Denies nausea, CP, SOB.     1/15:  Feeling much better. Tolerating diet. Was up in halls walking yesterday. Pain is much better. Passing gas but no BM as yet. Bili down and no longer jaundiced. He is stable to go home today per Surg. Follow up with PCP later this wk and with Surg as they direct. Tylenol for pain and he reports he has some [place] at home if he needs it. Gradually increase diet - try to stay on  low fat diet.       Review of Systems:   A comprehensive review of systems was negative except for that written in the HPI.     Objective:   Physical Exam:           Visit Vitals    BP (!) 154/93    Pulse 63    Temp 97.8 °F (36.6 °C)    Resp 17    Ht 5' 11\" (1.803 m)    Wt 86.2 kg (190 lb)    SpO2 97%    BMI 26.5 kg/m2    O2 Flow Rate (L/min): 2 l/min O2 Device: Room air     Temp (24hrs), Av.7 °F (37.1 °C), Min:97.5 °F (36.4 °C), Max:99.8 °F (37.7 °C)         1901 - 01/15 0700  In: 0445 [P.O.:450; I.V.:2075]  Out: 10      General:  Awake, alert, cooperative   Head:  Normocephalic, without obvious abnormality, atraumatic. Eyes:   PERRL, EOMs intact. C&S clear   Nose: Nares normal. Septum midline. Throat: Lips, mucosa, and tongue normal. Teeth and gums normal.   Neck: Supple, symmetrical, trachea midline   Back:   Symmetric, no curvature. +ttp lower lumbar muscle bilaterally   Lungs:   Clear to auscultation bilaterally. Heart:  Regular rate and rhythm, S1, S2 normal, no murmur, click, rub or gallop. Abdomen:   Slightly distended, BS normal, mild diffuse tenderness with no rebound or guarding, soft   Extremities: Extremities normal, atraumatic, no cyanosis or edema. No calf tenderness or cords.      Pulses: 2+ and symmetric all extremities. Skin: Jaundice no longer present,  No rashes or lesions   Neurologic: CNII-XII intact. Alert and oriented X 3.         Signed:  Linda Hauser MD  1/15/2018  11:30 AM

## 2018-01-15 NOTE — PROGRESS NOTES
SURGERY PROGRESS NOTE      Admit Date: 1/10/2018    POD 1 Day Post-Op    Procedure: Procedure(s):  CHOLECYSTECTOMY LAPAROSCOPIC      Subjective:     Patient feels much better. Denies nausea, pain and bloating. Tolerating PO      Objective:     Visit Vitals    /87    Pulse 65    Temp 98.2 °F (36.8 °C)    Resp 17    Ht 5' 11\" (1.803 m)    Wt 190 lb (86.2 kg)    SpO2 96%    BMI 26.5 kg/m2        Temp (24hrs), Av.7 °F (37.1 °C), Min:97.5 °F (36.4 °C), Max:99.8 °F (37.7 °C)         1901 - 01/15 0700  In: 2525 [P.O.:450; I.V.:5]  Out: 10     Physical Exam:    General:  alert, cooperative, no distress, appears stated age   Abdomen: soft, bowel sounds active, non-tender   Incision:   healing well, no drainage, no erythema, no hernia, no seroma, no swelling, no dehiscence, incision well approximated           Lab Results  Component Value Date/Time   WBC 4.6 01/15/2018 05:29 AM   HGB 12.1 01/15/2018 05:29 AM   Hemoglobin (POC) 14.3 2010 02:34 PM   HCT 34.4 01/15/2018 05:29 AM   Hematocrit (POC) 42 2010 02:34 PM   PLATELET 246  05:29 AM   MCV 88.9 01/15/2018 05:29 AM     Lab Results  Component Value Date/Time   ALT (SGPT) 126 01/15/2018 05:29 AM   AST (SGOT) 65 01/15/2018 05:29 AM   Alk.  phosphatase 67 01/15/2018 05:29 AM   Bilirubin, direct 1.3 01/15/2018 05:29 AM   Bilirubin, total 1.8 01/15/2018 05:29 AM   Bilirubin, total 1.8 01/15/2018 05:29 AM   Albumin 2.7 01/15/2018 05:29 AM   Protein, total 6.4 01/15/2018 05:29 AM   Ammonia 53 2013 08:40 PM   INR 1.1 01/10/2018 12:04 PM   Prothrombin time 10.6 01/10/2018 12:04 PM   PLATELET 366  05:29 AM   Hepatitis B surface Ag <0.10 01/10/2018 09:25 AM         Assessment:     Principal Problem:    Choledocholithiasis with acute cholecystitis with obstruction (1/10/2018)    Active Problems:    Elevated LFTs (1/10/2018)      Hyperlipidemia (1/10/2018)      GERD (gastroesophageal reflux disease) (1/10/2018)      Alcohol use (1/10/2018)    Doing well, ready for discharge      Plan:       D/C home

## 2018-01-15 NOTE — ROUTINE PROCESS
Bedside shift change report given to David Whitaker RN (oncoming nurse) by Earl Vargas. Alexis Quintero   (offgoing nurse). Report included the following information SBAR, Kardex and MAR.

## 2018-01-15 NOTE — DISCHARGE INSTRUCTIONS
Cholecystectomy: What to Expect at [address] [place] [address]  After your surgery, it is normal to feel weak and tired for several days after you return home. Your belly may be swollen. If you had laparoscopic surgery, you may also have pain in your shoulder for about 24 hours. You may have gas or need to burp a lot at first, and a few people get diarrhea. The diarrhea usually goes away in 2 to 4 weeks, but it may last longer. How quickly you recover depends on whether you had a laparoscopic or open surgery. · For a laparoscopic surgery, most people can go back to work or their normal routine in 1 to 2 weeks, but it may take longer, depending on the type of work you do. · For an open surgery, it will probably take 4 to 6 weeks before you get back to your normal routine. This care sheet gives you a general idea about how long it will take for you to recover. However, each person recovers at a different pace. Follow the steps below to get better as quickly as possible. How can you care for yourself at home? Activity  ? · Rest when you feel tired. Getting enough sleep will help you recover. ? · Try to walk each day. Start out by walking a little more than you did the day before. Gradually increase the amount you walk. Walking boosts blood flow and helps prevent pneumonia and constipation. ? · For about 2 to 4 weeks, avoid lifting anything that would make you strain. This may include a child, heavy grocery bags and milk containers, a heavy briefcase or backpack, cat litter or dog food bags, or a vacuum . ? · Avoid strenuous activities, such as biking, jogging, weightlifting, and aerobic exercise, until your doctor says it is okay. ? · You may shower 24 to 48 hours after surgery, if your doctor okays it. Pat the cut (incision) dry. Do not take a bath for the first 2 weeks, or until your doctor tells you it is okay.    ? · You may drive when you are no longer taking pain medicine and can quickly move your foot from the gas pedal to the brake. You must also be able to sit comfortably for a long period of time, even if you do not plan to go far. You might get caught in traffic. ? · For a laparoscopic surgery, most people can go back to work or their normal routine in 1 to 2 weeks, but it may take longer. For an open surgery, it will probably take 4 to 6 weeks before you get back to your normal routine. ? · Your doctor will tell you when you can have sex again. ? Diet  ? · Eat smaller meals more often instead of fewer larger meals. You can eat a normal diet, but avoid eating fatty foods for about 1 month. Fatty foods include hamburger, whole milk, cheese, and many snack foods. If your stomach is upset, try bland, low-fat foods like plain rice, broiled chicken, toast, and yogurt. ? · Drink plenty of fluids (unless your doctor tells you not to). ? · If you have diarrhea, try avoiding spicy foods, dairy products, fatty foods, and alcohol. You can also watch to see if specific foods cause it, and stop eating them. If the diarrhea continues for more than 2 weeks, talk to your doctor. ? · You may notice that your bowel movements are not regular right after your surgery. This is common. Try to avoid constipation and straining with bowel movements. You may want to take a fiber supplement every day. If you have not had a bowel movement after a couple of days, ask your doctor about taking a mild laxative. Medicines  ? · Your doctor will tell you if and when you can restart your medicines. He or she will also give you instructions about taking any new medicines. ? · If you take blood thinners, such as warfarin (Coumadin), clopidogrel (Plavix), or aspirin, be sure to talk to your doctor. He or she will tell you if and when to start taking those medicines again. Make sure that you understand exactly what your doctor wants you to do. ? · Take pain medicines exactly as directed.   ¨ If the doctor gave you a prescription medicine for pain, take it as prescribed. ¨ If you are not taking a prescription pain medicine, take an over-the-counter medicine such as acetaminophen (Tylenol), ibuprofen (Advil, Motrin), or naproxen (Aleve). Read and follow all instructions on the label. ¨ Do not take two or more pain medicines at the same time unless the doctor told you to. Many pain medicines contain acetaminophen, which is Tylenol. Too much Tylenol can be harmful. ? · If you think your pain medicine is making you sick to your stomach:  ¨ Take your medicine after meals (unless your doctor tells you not to). ¨ Ask your doctor for a different pain medicine. ? · If your doctor prescribed antibiotics, take them as directed. Do not stop taking them just because you feel better. You need to take the full course of antibiotics. Incision care  ? · If you have strips of tape on the incision, or cut, leave the tape on for a week or until it falls off.   ? · After 24 to 48 hours, wash the area daily with warm, soapy water, and pat it dry. ? · You may have staples to hold the cut together. Keep them dry until your doctor takes them out. This is usually in 7 to 10 days. ? · Keep the area clean and dry. You may cover it with a gauze bandage if it weeps or rubs against clothing. Change the bandage every day. ?Ice  ? · To reduce swelling and pain, put ice or a cold pack on your belly for 10 to 20 minutes at a time. Do this every 1 to 2 hours. Put a thin cloth between the ice and your skin. Follow-up care is a key part of your treatment and safety. Be sure to make and go to all appointments, and call your doctor if you are having problems. It's also a good idea to know your test results and keep a list of the medicines you take. When should you call for help? Call 911 anytime you think you may need emergency care. For example, call if:  ? · You passed out (lost consciousness). ? · You are short of breath. Cyril Puls ? Call your doctor now or seek immediate medical care if:  ? · You are sick to your stomach and cannot drink fluids. ? · You have pain that does not get better when you take your pain medicine. ? · You cannot pass stools or gas. ? · You have signs of infection, such as:  ¨ Increased pain, swelling, warmth, or redness. ¨ Red streaks leading from the incision. ¨ Pus draining from the incision. ¨ A fever. ? · Bright red blood has soaked through the bandage over your incision. ? · You have loose stitches, or your incision comes open. ? · You have signs of a blood clot in your leg (called a deep vein thrombosis), such as:  ¨ Pain in your calf, back of knee, thigh, or groin. ¨ Redness and swelling in your leg or groin. ? Watch closely for any changes in your health, and be sure to contact your doctor if you have any problems. Where can you learn more? Go to http://aminata-viki.info/. Enter [phone] in the search box to learn more about \"Cholecystectomy: What to Expect at Home. \"  Current as of: May 12, 2017  Content Version: 11.4  © 7274-5603 [employer]. Care instructions adapted under license by [employer] (which disclaims liability or warranty for this information). If you have questions about a medical condition or this instruction, always ask your healthcare professional. [place] [address] any warranty or liability for your use of this information.       Please call to make a follow up appointment in 10 to 14 days    Pat Castañeda MD, PhD, [address] Surgery at [address] [place] [address], [address] [place] [address] Kayce Alves   [facility] Pr[facility]adSout[facility]  [phone]  Fax [phone]    Patient Discharge Instructions    Stephane Harris / [id number] : 1955    Admitted 1/10/2018 Discharged: 1/15/2018 11:30 AM     ACUTE DIAGNOSES:  Elevated LFTs  CBD stone  COMMON BILE DUCT STONES  Elevated LFTs  Common BIle Duct Stones  Cholecystitis    CHRONIC MEDICAL DIAGNOSES:  Problem List as of 1/15/2018  Date Reviewed: 1/10/2018          Codes Class Noted - Resolved    Elevated LFTs ICD-10-CM: R79.89  ICD-9-CM: 790.6  1/10/2018 - Present        * (Principal)Choledocholithiasis with acute cholecystitis with obstruction ICD-10-CM: K80.41  ICD-9-CM: 574.31  1/10/2018 - Present        Hyperlipidemia ICD-10-CM: E78.5  ICD-9-CM: 272.4  1/10/2018 - Present        GERD (gastroesophageal reflux disease) ICD-10-CM: K21.9  ICD-9-CM: 530.81  1/10/2018 - Present        Alcohol use ICD-10-CM: Z78.9  ICD-9-CM: V49.89  1/10/2018 - Present        Memory loss ICD-10-CM: R41.3  ICD-9-CM: 780.93  5/31/2013 - Present              DISCHARGE MEDICATIONS:         · It is important that you take the medication exactly as they are prescribed. · Keep your medication in the bottles provided by the pharmacist and keep a list of the medication names, dosages, and times to be taken in your wallet. · Do not take other medications without consulting your doctor. DIET:  Low fat, Low cholesterol    ACTIVITY: Activity as tolerated    ADDITIONAL INFORMATION: If you experience any of the following symptoms then please call your primary care physician or return to the emergency room if you cannot get hold of your doctor: Fever, chills, nausea, vomiting, diarrhea, change in mentation, falling, bleeding, shortness of breath. FOLLOW UP CARE:  Dr. Zackery Rodriguez MD  you are to call and set up an appointment to see them with in 1 week. Follow-up with specialists at directed by them      Information obtained by :  I understand that if any problems occur once I am at home I am to contact my physician. I understand and acknowledge receipt of the instructions indicated above.                                                                                                                                            Physician's or R.N.'s Signature Date/Time                                                                                                                                              Patient or Representative Signature                                                          Date/Time

## 2018-01-15 NOTE — PROGRESS NOTES
Daily Progress Note and Discharge Note: 1/15/2018  Caitlyn Adan MD    Assessment/Plan:   Choledocholithiasis with acute cholecystitis with obstruction --  -s/p ERCP with sphincterotomy 1/10/18 by Dr Gerda Soriano  -advanced diet as tolerated   - s/p Lap Cholecystectomy 1/14 by Dr Azar Guillaume  -Follow up outpt with PCP later this wk and with Surg as they direct    Pancreatitis following ERCP with lipase >3000 -- lipase has now normalized  -pain improved       Elevated LFTs (1/10/2018) - 2/2 aforementioned   -monitor LFT's outpt - improving    Elevated BP -- monitor. Recheck outpt. With PCP       Hyperlipidemia (1/10/2018)  -resume home meds at discharge        GERD (gastroesophageal reflux disease)  -continue PPI and famotidine while in house        Alcohol use (1/10/2018) - denies h/o withdrawals. Drinks 1-2/day. Relatively low risk for withdrawal but will monitor   --no withdrawal       Problem List:  Problem List as of 1/15/2018  Date Reviewed: 1/10/2018          Codes Class Noted - Resolved    Elevated LFTs ICD-10-CM: R79.89  ICD-9-CM: 790.6  1/10/2018 - Present        * (Principal)Choledocholithiasis with acute cholecystitis with obstruction ICD-10-CM: K80.41  ICD-9-CM: 574.31  1/10/2018 - Present        Hyperlipidemia ICD-10-CM: E78.5  ICD-9-CM: 272.4  1/10/2018 - Present        GERD (gastroesophageal reflux disease) ICD-10-CM: K21.9  ICD-9-CM: 530.81  1/10/2018 - Present        Alcohol use ICD-10-CM: Z78.9  ICD-9-CM: V49.89  1/10/2018 - Present        Memory loss ICD-10-CM: R41.3  ICD-9-CM: 780.93  5/31/2013 - Present              HPI:   Mr. Zackery Wright is a 63 y.o.  male with PMH of HAWA not on CPAP, GERD, XOL and alcohol use admitted for RUQ pain and transaminitis. He was evaluated in the ED and underwent a CT ab/pelvis showing \"Cholelithiasis and pericholecystic fluid highly worrisome for acute cholecystitis. Distal common bile duct calculi\".  He subsequently was taken for ERCP by GI and is currently on antibiotics with plans for lap rehana in the AM. Pt currently without ab pain. No N/V. (Dr Seven Wiseman)    1/11/18: Feeling much worse. Had ERCP and now with pancreatitis. Had uncontrolled pain last night. His Dilaudid has been increased and will add O2 as he has a history of HAWA and snoring. Lipase is >3000. Bili and LFTs higher. Family disappointed that he could not have surgery this am but understand why. Being held in ER as there are no beds available. 1/12:  Still has epigastric and mid back pain but somewhat controlled with Dilaudid. No N/V at this moment. He wants to try some liquids. LFTs down this AM - bili down to 4.7 from 5.2. WBC up. No temps. Lipase down to 1400 from >3000 yesterday. 1/13: Continues to improve. Continued lower back pain that is similar to what he gets when in the bed or sitting long term but walking in [place] and moving better in the bed has helped. Still with some abd. Pain but overall improved. Denies nausea, vomiting. No CP, SOB. Not hungry. 1/14: Pain improved. Tolerated liquids yesterday though limited his intake because he feels full due to constipation. No flatus. Denies nausea, CP, SOB.    1/15:  Feeling much better. Tolerating diet. Was up in halls walking yesterday. Pain is much better. Passing gas but no BM as yet. Bili down and no longer jaundiced. He is stable to go home today per Surg. Follow up with PCP later this wk and with Surg as they direct. Tylenol for pain and he reports he has some [place] at home if he needs it. Gradually increase diet - try to stay on  low fat diet. Review of Systems:   A comprehensive review of systems was negative except for that written in the HPI.     Objective:   Physical Exam:     Visit Vitals    BP (!) 154/93    Pulse 63    Temp 97.8 °F (36.6 °C)    Resp 17    Ht 5' 11\" (1.803 m)    Wt 86.2 kg (190 lb)    SpO2 97%    BMI 26.5 kg/m2    O2 Flow Rate (L/min): 2 l/min O2 Device: Room air    Temp (24hrs), Av.7 °F (37.1 °C), Min:97.5 °F (36.4 °C), Max:99.8 °F (37.7 °C)         190 - 01/15 0700  In: 2525 [P.O.:450; I.V.:5]  Out: 10     General:  Awake, alert, cooperative   Head:  Normocephalic, without obvious abnormality, atraumatic. Eyes:   PERRL, EOMs intact. C&S clear   Nose: Nares normal. Septum midline. Throat: Lips, mucosa, and tongue normal. Teeth and gums normal.   Neck: Supple, symmetrical, trachea midline   Back:   Symmetric, no curvature. +ttp lower lumbar muscle bilaterally   Lungs:   Clear to auscultation bilaterally. Heart:  Regular rate and rhythm, S1, S2 normal, no murmur, click, rub or gallop. Abdomen:   Slightly distended, BS normal, mild diffuse tenderness with no rebound or guarding, soft   Extremities: Extremities normal, atraumatic, no cyanosis or edema. No calf tenderness or cords. Pulses: 2+ and symmetric all extremities. Skin: Jaundice no longer present,  No rashes or lesions   Neurologic: CNII-XII intact. Alert and oriented X 3. Data Review:       Recent Days:  Recent Labs      01/15/18   0529  01/14/18   0026  01/13/18   0458   WBC  4.6  8.4  8.2  11.5*   HGB  12.1  12.5  12.7  13.7   HCT  34.4*  36.1*  35.7*  39.6   PLT  115*  109*  113*  116*     Recent Labs      01/15/18   0518   NA   --   138  142   K   --   3.4*  3.8   CL   --   102  105   CO2   --   28  28   GLU   --   107*  82   BUN   --   10  14   CREA   --   1.03  1.09   CA   --   8.8  8.9   MG   --    --   1.9   ALB   --   2.8*  2.9*   TBILI  1.8*  3.7*  3.9*   SGOT   --   43*  48*   ALT   --   126*  166*     Lab Results   Component Value Date/Time    Lipase 95 01/15/2018 05:29 AM     No results for input(s): PH, PCO2, PO2, HCO3, FIO2 in the last 72 hours.     24 Hour Results:  Recent Results (from the past 24 hour(s))   LIPASE    Collection Time: 01/15/18  5:29 AM   Result Value Ref Range    Lipase 95 73 - 393 U/L   BILIRUBIN, FRACTIONATED Collection Time: 01/15/18  5:29 AM   Result Value Ref Range    Bilirubin, total 1.8 (H) 0.2 - 1.0 MG/DL    Bilirubin, direct 1.3 (H) 0.0 - 0.2 MG/DL    Bilirubin, indirect 0.5 0 - 1.1 MG/DL   CBC WITH AUTOMATED DIFF    Collection Time: 01/15/18  5:29 AM   Result Value Ref Range    WBC 4.6 4.1 - 11.1 K/uL    RBC 3.87 (L) 4.10 - 5.70 M/uL    HGB 12.1 12.1 - 17.0 g/dL    HCT 34.4 (L) 36.6 - 50.3 %    MCV 88.9 80.0 - 99.0 FL    MCH 31.3 26.0 - 34.0 PG    MCHC 35.2 30.0 - 36.5 g/dL    RDW 12.2 11.5 - 14.5 %    PLATELET 031 (L) 661 - 400 K/uL    NEUTROPHILS 81 (H) 32 - 75 %    LYMPHOCYTES 8 (L) 12 - 49 %    MONOCYTES 11 5 - 13 %    EOSINOPHILS 0 0 - 7 %    BASOPHILS 0 0 - 1 %    ABS. NEUTROPHILS 3.7 1.8 - 8.0 K/UL    ABS. LYMPHOCYTES 0.4 (L) 0.8 - 3.5 K/UL    ABS. MONOCYTES 0.5 0.0 - 1.0 K/UL    ABS. EOSINOPHILS 0.0 0.0 - 0.4 K/UL    ABS.  BASOPHILS 0.0 0.0 - 0.1 K/UL     Medications reviewed  Current Facility-Administered Medications   Medication Dose Route Frequency    enoxaparin (LOVENOX) injection 40 mg  40 mg SubCUTAneous Q24H    HYDROcodone-acetaminophen (NORCO) 5-325 mg per tablet 2 Tab  2 Tab Oral Q4H PRN    piperacillin-tazobactam (ZOSYN) 3.375 g in 0.9% sodium chloride (MBP/ADV) 100 mL  3.375 g IntraVENous Q8H    cloNIDine HCl (CATAPRES) tablet 0.1 mg  0.1 mg Oral Q6H PRN    pantoprazole (PROTONIX) tablet 40 mg  40 mg Oral DAILY    famotidine (PEPCID) tablet 20 mg  20 mg Oral QPM    lactated Ringers infusion  125 mL/hr IntraVENous CONTINUOUS    influenza vaccine 2017-18 (3 yrs+)(PF) (FLUZONE QUAD/FLUARIX QUAD) injection 0.5 mL  0.5 mL IntraMUSCular PRIOR TO DISCHARGE    prochlorperazine (COMPAZINE) injection 10 mg  10 mg IntraVENous Q6H PRN    HYDROmorphone (DILAUDID) injection 0.5 mg  0.5 mg IntraVENous Q3H PRN    sodium chloride (NS) flush 5-10 mL  5-10 mL IntraVENous Q8H    sodium chloride (NS) flush 5-10 mL  5-10 mL IntraVENous PRN    naloxone (NARCAN) injection 0.4 mg  0.4 mg IntraVENous PRN  ondansetron (ZOFRAN) injection 4 mg  4 mg IntraVENous Q4H PRN    glucagon (GLUCAGEN) injection 1 mg  1 mg IntraVENous PRN    LORazepam (ATIVAN) tablet 2 mg  2 mg Oral Q1H PRN    LORazepam (ATIVAN) tablet 4 mg  4 mg Oral Q1H PRN     Care Plan discussed with: Patient/Family/nurse    Dereck Reveles MD

## 2018-01-15 NOTE — ROUTINE PROCESS
TRANSFER - OUT REPORT:    Verbal report given to Elvin Ramos RN(name) on Cynthia Promise  being transferred to Pre-OP(unit) for ordered procedure       Report consisted of patients Situation, Background, Assessment and   Recommendations(SBAR). Information from the following report(s) SBAR, Kardex and MAR was reviewed with the receiving nurse. Lines:   Peripheral IV 01/12/18 Left Arm (Active)   Site Assessment Clean, dry, & intact 1/14/2018 11:50 AM   Phlebitis Assessment 0 1/14/2018 11:50 AM   Infiltration Assessment 0 1/14/2018 11:50 AM   Dressing Status Clean, dry, & intact 1/14/2018 11:50 AM   Dressing Type Transparent;Tape 1/14/2018 11:50 AM   Hub Color/Line Status Pink; Infusing 1/14/2018 11:50 AM   Action Taken Open ports on tubing capped 1/14/2018  8:38 AM   Alcohol Cap Used Yes 1/14/2018 11:50 AM        Opportunity for questions and clarification was provided.       Patient transported with:   Registered Nurse

## 2018-01-15 NOTE — ROUTINE PROCESS
TRANSFER - IN REPORT:    Verbal report received from Tena Jaffe RN(name) on Jaswant Larson  being received from [facility]) for routine post - op      Report consisted of patients Situation, Background, Assessment and   Recommendations(SBAR). Information from the following report(s) SBAR was reviewed with the receiving nurse. Opportunity for questions and clarification was provided. Assessment completed upon patients arrival to unit and care assumed.

## 2018-01-29 ENCOUNTER — OFFICE VISIT (OUTPATIENT)
Dept: SURGERY | Age: 63
End: 2018-01-29

## 2018-01-29 VITALS
SYSTOLIC BLOOD PRESSURE: 118 MMHG | HEIGHT: 71 IN | BODY MASS INDEX: 24.92 KG/M2 | HEART RATE: 97 BPM | RESPIRATION RATE: 14 BRPM | DIASTOLIC BLOOD PRESSURE: 78 MMHG | TEMPERATURE: 98.4 F | WEIGHT: 178 LBS | OXYGEN SATURATION: 98 %

## 2018-01-29 DIAGNOSIS — Z09 POSTOPERATIVE EXAMINATION: Primary | ICD-10-CM

## 2018-01-29 NOTE — PROGRESS NOTES
SUBJECTIVE: Belkis Willoughby is a 63 y.o. male is seen for a routine postop check. Reports no problems with the wound or other issues. Activity, diet and bowels are normal. No pain. OBJECTIVE: Appears well. Wound is well healed without complications or infection. ASSESSMENT: normal postoperative course, doing well. PLAN:  Return PRN.

## 2018-01-29 NOTE — PROGRESS NOTES
1. Have you been to the ER, urgent care clinic since your last visit? Hospitalized since your last visit?no    2. Have you seen or consulted any other health care providers outside of the [address] [place] [address] since your last visit? Include any pap smears or colon screening.  no

## 2018-10-08 NOTE — PROGRESS NOTES
Progress Note    Patient: Benedict Mathew MRN: [id number]  SSN: [id number]    YOB: 1955  Age: 63 y.o. Sex: male      Admit Date: 1/10/2018    2 Days Post-Op    Procedure:  Procedure(s):  ENDOSCOPIC RETROGRADE CHOLANGIOPANCREATOGRAPHY with fluoro   ENDOSCOPIC STONE EXTRACTION/BALLOON SWEEP  SPHINCTEROTOMY    Subjective:     Mr. Zambrano Heal still c/o abdominal and back like he was run over by a truck. He denies any n/v.    Objective:     Visit Vitals    /89 (BP 1 Location: Right arm, BP Patient Position: At rest)    Pulse (!) 105    Temp 98.3 °F (36.8 °C)    Resp 16    Ht 5' 11\" (1.803 m)    Wt 190 lb (86.2 kg)    SpO2 95%    BMI 26.5 kg/m2       Temp (24hrs), Av.3 °F (36.8 °C), Min:97.9 °F (36.6 °C), Max:99 °F (37.2 °C)      Physical Exam:    GENERAL: alert, fatigued, cooperative, no distress, EYE: positive findings: sclerae are icteric, ABDOMEN: Soft, mild distended, moderate epigastric tenderness with voluntary guarding.     Lab Review:   BMP:   Lab Results   Component Value Date/Time     2018 03:55 AM    K 3.7 2018 03:55 AM     2018 03:55 AM    CO2 26 2018 03:55 AM    AGAP 9 2018 03:55 AM    GLU 80 2018 03:55 AM    BUN 10 2018 03:55 AM    CREA 1.10 2018 03:55 AM    GFRAA >60 2018 03:55 AM    GFRNA >60 2018 03:55 AM     CMP:   Lab Results   Component Value Date/Time     2018 03:55 AM    K 3.7 2018 03:55 AM     2018 03:55 AM    CO2 26 2018 03:55 AM    AGAP 9 2018 03:55 AM    GLU 80 2018 03:55 AM    BUN 10 2018 03:55 AM    CREA 1.10 2018 03:55 AM    GFRAA >60 2018 03:55 AM    GFRNA >60 2018 03:55 AM    CA 8.9 2018 03:55 AM    MG 1.8 2018 03:55 AM    ALB 3.5 2018 03:55 AM    TP 6.8 2018 03:55 AM    GLOB 3.3 2018 03:55 AM    AGRAT 1.1 2018 03:55 AM    SGOT 114 (H) 2018 03:55 AM     (H) 2018 03:55 AM     CBC:   Lab Results   Component Value Date/Time    WBC 15.6 (H) 01/12/2018 03:55 AM    HGB 16.1 01/12/2018 03:55 AM    HCT 44.2 01/12/2018 03:55 AM     (L) 01/12/2018 03:55 AM     Pancreatic Markers:   Lab Results   Component Value Date/Time    LPSE 1427 (H) 01/12/2018 03:55 AM       Assessment:     Hospital Problems  Date Reviewed: 1/10/2018          Codes Class Noted POA    Elevated LFTs ICD-10-CM: R79.89  ICD-9-CM: 790.6  1/10/2018 Yes        * (Principal)Choledocholithiasis with acute cholecystitis with obstruction ICD-10-CM: K80.41  ICD-9-CM: 574.31  1/10/2018 Yes        Hyperlipidemia ICD-10-CM: E78.5  ICD-9-CM: 272.4  1/10/2018 Yes        GERD (gastroesophageal reflux disease) ICD-10-CM: K21.9  ICD-9-CM: 530.81  1/10/2018 Yes        Alcohol use ICD-10-CM: Z78.9  ICD-9-CM: V49.89  1/10/2018 Yes              Plan/Recommendations/Medical Decision Making:     Lipase still high, but down from yesterday and bilirubin still up. He still has pain. His WBC is up and now getting tachycardic. Hold off on surgery today. Continue bowel rest and IVF's. Narcotic analgesics as needed. Continue Zosyn. Will follow over the weekend. If better, then can plan for lap rehana on Monday. If continues to not improve, then I would repeat the CT or MRCP since bilirubin has not come down as expected. I discussed the plan with the family and . Kinza Avila.     Signed By: Nevaeh Matamoros MD     January 12, 2018 Implemented All Fall with Harm Risk Interventions:  [place] to call system. Call bell, personal items and telephone within reach. Instruct patient to call for assistance. Room bathroom lighting operational. Non-slip footwear when patient is off stretcher. Physically safe environment: no spills, clutter or unnecessary equipment. Stretcher in lowest position, wheels locked, appropriate side rails in place. Provide visual cue, wrist band, yellow gown, etc. Monitor gait and stability. Monitor for mental status changes and reorient to person, place, and time. Review medications for side effects contributing to fall risk. Reinforce activity limits and safety measures with patient and family. Provide visual clues: red socks.

## 2019-04-16 NOTE — ANESTHESIA POSTPROCEDURE EVALUATION
Post-Anesthesia Evaluation and Assessment    Patient: Juancarlos Ro MRN: [id number]  SSN: [id number]    YOB: 1955  Age: 63 y.o. Sex: male       Cardiovascular Function/Vital Signs  Visit Vitals    BP (!) 148/95    Pulse 100    Temp 37.7 °C (99.8 °F)    Resp 16    Ht 5' 11\" (1.803 m)    Wt 86.2 kg (190 lb)    SpO2 96%    BMI 26.5 kg/m2       Patient is status post general anesthesia for Procedure(s):  CHOLECYSTECTOMY LAPAROSCOPIC. Nausea/Vomiting: None    Postoperative hydration reviewed and adequate. Pain:  Pain Scale 1: Visual (01/14/18 1131)  Pain Intensity 1: 0 (01/14/18 1131)   Managed    Neurological Status:   Neuro (WDL): Exceptions to WDL (01/14/18 1131)  Neuro  Neurologic State: Drowsy; Pharmacologically induced (comment) (easily arrousable, post anesthesia) (01/14/18 1131)  Orientation Level: Disoriented to person;Disoriented to place (01/14/18 1131)  Cognition: Follows commands (01/14/18 1131)  LUE Motor Response: Purposeful (01/14/18 1131)  LLE Motor Response: Purposeful (01/14/18 1131)  RUE Motor Response: Purposeful (01/14/18 1131)  RLE Motor Response: Purposeful (01/14/18 1131)   At baseline    Mental Status and Level of Consciousness: Arousable    Pulmonary Status:   O2 Device: Room air (01/14/18 1140)   Adequate oxygenation and airway patent    Complications related to anesthesia: None    Post-anesthesia assessment completed.  No concerns    Signed By: Betti Ganser, MD     January 14, 2018 Posterior Auricular Interpolation Flap Text: A decision was made to reconstruct the defect utilizing an interpolation axial flap and a staged reconstruction.  A telfa template was made of the defect.  This telfa template was then used to outline the posterior auricular interpolation flap.  The donor area for the pedicle flap was then injected with anesthesia.  The flap was excised through the skin and subcutaneous tissue down to the layer of the underlying musculature.  The pedicle flap was carefully excised within this deep plane to maintain its blood supply.  The edges of the donor site were undermined.   The donor site was closed in a primary fashion.  The pedicle was then rotated into position and sutured.  Once the tube was sutured into place, adequate blood supply was confirmed with blanching and refill.  The pedicle was then wrapped with xeroform gauze and dressed appropriately with a telfa and gauze bandage to ensure continued blood supply and protect the attached pedicle.

## 2019-08-06 ENCOUNTER — HOSPITAL ENCOUNTER (OUTPATIENT)
Dept: CT IMAGING | Age: 64
Discharge: HOME | End: 2019-08-06
Attending: FAMILY MEDICINE
Payer: SELF-PAY

## 2019-08-06 DIAGNOSIS — R07.89 OTHER CHEST PAIN: ICD-10-CM

## 2019-08-06 DIAGNOSIS — E78.1 PURE HYPERGLYCERIDEMIA: ICD-10-CM

## 2019-08-06 PROCEDURE — 75571 CT HRT W/O DYE W/CA TEST: CPT

## 2019-08-07 NOTE — CARDIO/PULMONARY
Cardiopulmonary Rehab: I reached this patient by phone and shared his coronary calcium CT score of \"17 \" with him. Education given regarding coronary artery disease and its effects on the cardiovascular system were reviewed. Patient states that he does not have a family history of coronary artery disease, that he does not have diabetes, and  states he is not a nicotine user. Patient reports he is currently requiring cholesterol medication but not blood pressure medication. Patient reports being of over weight, reports not making heart healthy diet choices on most days and is regularly physically active. We discussed the recommendations for and potential benefits of weight loss and a heart healthier diet. Patient does feel that stress is a risk factor for him. We discussed the daily stress management benefits of regular physical activity, edgar chi, yoga and deep breathing throughout the day. Patient to follow up with primary care physician. Understanding verbalized and no further questions at this time. Patient requests a copy of this report be mailed to his home, which we will do.

## 2022-03-19 PROBLEM — K21.9 GERD (GASTROESOPHAGEAL REFLUX DISEASE): Status: ACTIVE | Noted: 2018-01-10

## 2022-03-19 PROBLEM — E78.5 HYPERLIPIDEMIA: Status: ACTIVE | Noted: 2018-01-10

## 2022-03-19 PROBLEM — Z78.9 ALCOHOL USE: Status: ACTIVE | Noted: 2018-01-10

## 2022-03-19 PROBLEM — K80.43 CHOLEDOCHOLITHIASIS WITH ACUTE CHOLECYSTITIS WITH OBSTRUCTION: Status: ACTIVE | Noted: 2018-01-10

## 2022-03-19 PROBLEM — F10.90 ALCOHOL USE: Status: ACTIVE | Noted: 2018-01-10

## 2022-03-20 PROBLEM — R79.89 ELEVATED LFTS: Status: ACTIVE | Noted: 2018-01-10

## 2022-06-26 ENCOUNTER — APPOINTMENT (OUTPATIENT)
Dept: CT IMAGING | Age: 67
End: 2022-06-26
Attending: STUDENT IN AN ORGANIZED HEALTH CARE EDUCATION/TRAINING PROGRAM
Payer: COMMERCIAL

## 2022-06-26 ENCOUNTER — APPOINTMENT (OUTPATIENT)
Dept: GENERAL RADIOLOGY | Age: 67
End: 2022-06-26
Attending: STUDENT IN AN ORGANIZED HEALTH CARE EDUCATION/TRAINING PROGRAM
Payer: COMMERCIAL

## 2022-06-26 ENCOUNTER — HOSPITAL ENCOUNTER (EMERGENCY)
Age: 67
Discharge: HOME | End: 2022-06-26
Attending: STUDENT IN AN ORGANIZED HEALTH CARE EDUCATION/TRAINING PROGRAM
Payer: COMMERCIAL

## 2022-06-26 VITALS
RESPIRATION RATE: 20 BRPM | BODY MASS INDEX: 26.6 KG/M2 | SYSTOLIC BLOOD PRESSURE: 142 MMHG | TEMPERATURE: 97.7 F | HEIGHT: 71 IN | OXYGEN SATURATION: 98 % | WEIGHT: 190 LBS | DIASTOLIC BLOOD PRESSURE: 88 MMHG | HEART RATE: 73 BPM

## 2022-06-26 DIAGNOSIS — S76.111A QUADRICEPS TENDON RUPTURE, RIGHT, INITIAL ENCOUNTER: Primary | ICD-10-CM

## 2022-06-26 PROCEDURE — 99284 EMERGENCY DEPT VISIT MOD MDM: CPT

## 2022-06-26 PROCEDURE — 74011250637 HC RX REV CODE- 250/637: Performed by: STUDENT IN AN ORGANIZED HEALTH CARE EDUCATION/TRAINING PROGRAM

## 2022-06-26 PROCEDURE — 73700 CT LOWER EXTREMITY W/O DYE: CPT

## 2022-06-26 PROCEDURE — 73562 X-RAY EXAM OF KNEE 3: CPT

## 2022-06-26 RX ORDER — OXYCODONE AND ACETAMINOPHEN 5; 325 MG/1; MG/1
2 TABLET ORAL
Status: COMPLETED | OUTPATIENT
Start: 2022-06-26 | End: 2022-06-26

## 2022-06-26 RX ORDER — IBUPROFEN 600 MG/1
600 TABLET ORAL
Status: COMPLETED | OUTPATIENT
Start: 2022-06-26 | End: 2022-06-26

## 2022-06-26 RX ORDER — MORPHINE SULFATE 4 MG/ML
4 INJECTION INTRAVENOUS ONCE
Status: DISCONTINUED | OUTPATIENT
Start: 2022-06-26 | End: 2022-06-26 | Stop reason: HOSPADM

## 2022-06-26 RX ORDER — CYCLOBENZAPRINE HCL 10 MG
10 TABLET ORAL
Qty: 15 TABLET | Refills: 0 | Status: SHIPPED | OUTPATIENT
Start: 2022-06-26

## 2022-06-26 RX ORDER — OXYCODONE AND ACETAMINOPHEN 5; 325 MG/1; MG/1
1 TABLET ORAL
Qty: 20 TABLET | Refills: 0 | Status: SHIPPED | OUTPATIENT
Start: 2022-06-26 | End: 2022-07-01

## 2022-06-26 RX ORDER — DIAZEPAM 5 MG/1
5 TABLET ORAL
Status: DISCONTINUED | OUTPATIENT
Start: 2022-06-26 | End: 2022-06-26 | Stop reason: HOSPADM

## 2022-06-26 RX ADMIN — IBUPROFEN 600 MG: 600 TABLET, FILM COATED ORAL at 01:35

## 2022-06-26 RX ADMIN — OXYCODONE HYDROCHLORIDE AND ACETAMINOPHEN 2 TABLET: 5; 325 TABLET ORAL at 01:35

## 2022-06-26 NOTE — ED PROVIDER NOTES
Jaret Briggs is a 61 y.o. male with past medical history notable for GERD, sleep apnea presenting with right knee pain. He states that he was walking in his garage around 1 AM and slipped, unsure of the exact mechanism of knee injury but experienced severe immediate pain in the distal thigh just proximal to the right knee. He had difficulty extending his knee actively immediately afterwards. No numbness or tingling. States the symptoms are very similar to his previous quadricep tendon injury. He has not been able to ambulate since the injury. Denies deformity or dislocation of the knee. Past Medical History:   Diagnosis Date    GERD (gastroesophageal reflux disease)     Sleep apnea     mild - CPAP not indicated       Past Surgical History:   Procedure Laterality Date    HX APPENDECTOMY  1997    HX ORTHOPAEDIC  2013    right shoulder    HX OTHER SURGICAL  2016    Inguinal hernia repair         History reviewed. No pertinent family history. Social History     Socioeconomic History    Marital status:      Spouse name: Not on file    Number of children: Not on file    Years of education: Not on file    Highest education level: Not on file   Occupational History    Not on file   Tobacco Use    Smoking status: Never Smoker    Smokeless tobacco: Never Used   Substance and Sexual Activity    Alcohol use: Yes     Comment: 3 bottles of beer everyday    Drug use: No    Sexual activity: Not on file   Other Topics Concern    Not on file   Social History Narrative    Not on file     Social Determinants of Health     Financial Resource Strain:     Difficulty of Paying Living Expenses: Not on file   Food Insecurity:     Worried About Running Out of Food in the Last Year: Not on file    Allyson of Food in the Last Year: Not on file   Transportation Needs:     Lack of Transportation (Medical): Not on file    Lack of Transportation (Non-Medical):  Not on file   Physical Activity:     Days of Exercise per Week: Not on file    Minutes of Exercise per Session: Not on file   Stress:     Feeling of Stress : Not on file   Social Connections:     Frequency of Communication with Friends and Family: Not on file    Frequency of Social Gatherings with Friends and Family: Not on file    Attends Sikh Services: Not on file    Active Member of [address] [place] [address] [employer] or Organizations: Not on file    Attends Club or Organization Meetings: Not on file    Marital Status: Not on file   Intimate Partner Violence:     Fear of Current or Ex-Partner: Not on file    Emotionally Abused: Not on file    Physically Abused: Not on file    Sexually Abused: Not on file   Housing Stability:     Unable to Pay for Housing in the Last Year: Not on file    Number of [place] in the Last Year: Not on file    Unstable Housing in the Last Year: Not on file         ALLERGIES: Patient has no known allergies. Review of Systems   Constitutional: Negative for chills, fatigue and fever. HENT: Negative for ear pain, sore throat and trouble swallowing. Eyes: Negative for visual disturbance. Respiratory: Negative for cough and shortness of breath. Cardiovascular: Negative for chest pain. Gastrointestinal: Negative for abdominal pain. Genitourinary: Negative for dysuria. Musculoskeletal: Positive for arthralgias and myalgias. Negative for back pain. Skin: Negative for rash. Neurological: Negative for light-headedness and headaches. Psychiatric/Behavioral: Negative for confusion. All other systems reviewed and are negative. Vitals:    06/26/22 0114   BP: (!) 142/88   Pulse: 73   Resp: 20   Temp: 97.7 °F (36.5 °C)   SpO2: 98%   Weight: 86.2 kg (190 lb)   Height: 5' 11\" (1.803 m)            Physical Exam  Vitals and nursing note reviewed. Constitutional:       General: He is not in acute distress. Appearance: He is well-developed. He is not ill-appearing, toxic-appearing or diaphoretic.    HENT: Head: Normocephalic and atraumatic. Nose: Nose normal.      Mouth/Throat:      Mouth: Mucous membranes are dry. Eyes:      Pupils: Pupils are equal, round, and reactive to light. Cardiovascular:      Pulses: Normal pulses. Pulmonary:      Effort: Pulmonary effort is normal. No respiratory distress. Breath sounds: No stridor. Abdominal:      General: Abdomen is flat. There is no distension. Tenderness: There is no abdominal tenderness. Musculoskeletal:         General: Normal range of motion. Cervical back: Normal range of motion. No rigidity. Legs:    Skin:     General: Skin is warm. Capillary Refill: Capillary refill takes less than 2 seconds. Neurological:      General: No focal deficit present. Mental Status: He is alert and oriented to person, place, and time. Cranial Nerves: No cranial nerve deficit. Psychiatric:         Mood and Affect: Mood normal.         Behavior: Behavior normal.          MDM     Patient presenting with acute pain in the distal thigh and a palpable defect in the area of his quadricep tendon. He likely has a quadricep tendon rupture. No radiographic evidence of fracture. He had similar pathology on the contralateral side. Plan to follow-up with established orthopedic surgeon for reevaluation and likely operative management. Placed in a knee immobilizer, instructions to remain nonweightbearing on the affected extremity, crutches provided. PROGRESS NOTE:  2:58 AM  The patient has been re-evaluated and are stable for discharge. All available radiology and laboratory results have been reviewed with patient and/or available family.   Patient and/or family verbally conveyed their understanding and agreement of the patient's signs, symptoms, diagnosis, treatment and prognosis and additionally agree to follow-up as recommended in the discharge instructions or to return to the Emergency Department should their condition change or worsen prior to their follow-up appointment. All questions have been answered and patient and/or available family who express understanding. LABORATORY RESULTS:  Labs Reviewed - No data to display    IMAGING RESULTS:  CT LOW EXT RT WO CONT         XR KNEE RT 3 V   Final Result   No acute abnormality. MEDICATIONS GIVEN:  Medications   diazePAM (VALIUM) tablet 5 mg (has no administration in time range)   morphine injection 4 mg (has no administration in time range)   oxyCODONE-acetaminophen (PERCOCET) 5-325 mg per tablet 2 Tablet (2 Tablets Oral Given 6/26/22 0135)   ibuprofen (MOTRIN) tablet 600 mg (600 mg Oral Given 6/26/22 0135)       IMPRESSION:  1. Quadriceps tendon rupture, right, initial encounter        PLAN:  Follow-up Information     Follow up With Specialties Details Why Nicol Lee MD Orthopedic Surgery Schedule an appointment as soon as possible for a visit  Call for a follow up appointment. [id number] [facility]  [address] [facility]  [phone]           Current Discharge Medication List      START taking these medications    Details   oxyCODONE-acetaminophen (Percocet) 5-325 mg per tablet Take 1 Tablet by mouth every six (6) hours as needed for Pain for up to 5 days. Max Daily Amount: 4 Tablets. Qty: 20 Tablet, Refills: 0  Start date: 6/26/2022, End date: 7/1/2022    Associated Diagnoses: Quadriceps tendon rupture, right, initial encounter      cyclobenzaprine (FLEXERIL) 10 mg tablet Take 1 Tablet by mouth two (2) times daily as needed for Muscle Spasm(s). Qty: 15 Tablet, Refills: 0  Start date: 6/26/2022               Ellie Stahl MD      Please note that this dictation was completed with [employer], the [employer] voice recognition software. Quite often unanticipated grammatical, syntax, homophones, and other interpretive errors are inadvertently transcribed by the computer software. Please disregard these errors.   Please excuse any errors that have escaped final proofreading.          Procedures

## 2022-06-26 NOTE — ED TRIAGE NOTES
Patient was brought in by wife due to falling in the garage roughly around midnight. States he is having severe pain in right thigh, and making it unable for him to walk. States the pain is a 10/10.

## 2022-06-26 NOTE — ED NOTES
The patient was discharged home in stable condition. The patient is alert and oriented, in no respiratory distress. The patient's diagnosis, condition and treatment were explained. The patient expressed understanding. No prescriptions given/e-scribed to pharmacy. No work/school note given. A discharge plan has been developed. A  was not involved in the process. Aftercare instructions were given. Pt discharged from the ED via w/c by RN with family.

## 2022-06-26 NOTE — DISCHARGE INSTRUCTIONS
Do not bear weight on your right leg until you are evaluated and cleared by orthopedics. You will likely need a surgery to repair the quadriceps tendon.

## 2022-06-28 ENCOUNTER — TRANSCRIBE ORDER (OUTPATIENT)
Dept: SCHEDULING | Age: 67
End: 2022-06-28

## 2022-06-28 DIAGNOSIS — S76.111A RUPTURE OF QUADRICEPS TENDON, RIGHT, INITIAL ENCOUNTER: Primary | ICD-10-CM

## 2022-06-29 ENCOUNTER — HOSPITAL ENCOUNTER (OUTPATIENT)
Dept: MRI IMAGING | Age: 67
Discharge: HOME | End: 2022-06-29
Attending: STUDENT IN AN ORGANIZED HEALTH CARE EDUCATION/TRAINING PROGRAM
Payer: COMMERCIAL

## 2022-06-29 DIAGNOSIS — S76.111A RUPTURE OF QUADRICEPS TENDON, RIGHT, INITIAL ENCOUNTER: ICD-10-CM

## 2022-06-29 PROCEDURE — 73721 MRI JNT OF LWR EXTRE W/O DYE: CPT

## 2022-10-12 NOTE — ROUTINE PROCESS
1/15/18  8:58AM  PCP LAURIE appt scheduled with Dr. Theresa Dacosta on 1/19/18 at 10:15AM. Appt added to AVS. Amanda Lima CM Specialist Pt called to say that her orthopedic dr is ordering physical therapy no need for FLORENCIA Dhillon to order

## 2024-03-18 ENCOUNTER — TRANSCRIBE ORDERS (OUTPATIENT)
Facility: HOSPITAL | Age: 69
End: 2024-03-18

## 2024-03-18 DIAGNOSIS — M25.511 CHRONIC RIGHT SHOULDER PAIN: Primary | ICD-10-CM

## 2024-03-18 DIAGNOSIS — G89.29 CHRONIC RIGHT SHOULDER PAIN: Primary | ICD-10-CM

## 2024-03-20 ENCOUNTER — HOSPITAL ENCOUNTER (OUTPATIENT)
Facility: HOSPITAL | Age: 69
Discharge: HOME | End: 2024-03-23
Attending: ORTHOPAEDIC SURGERY
Payer: MEDICARE

## 2024-03-20 VITALS — WEIGHT: 190 LBS | BODY MASS INDEX: 26.5 KG/M2

## 2024-03-20 DIAGNOSIS — G89.29 CHRONIC RIGHT SHOULDER PAIN: ICD-10-CM

## 2024-03-20 DIAGNOSIS — M25.511 CHRONIC RIGHT SHOULDER PAIN: ICD-10-CM

## 2024-03-20 PROCEDURE — 73221 MRI JOINT UPR EXTREM W/O DYE: CPT

## 2025-07-29 ENCOUNTER — APPOINTMENT (OUTPATIENT)
Facility: HOSPITAL | Age: 70
End: 2025-07-29
Payer: MEDICARE

## 2025-07-29 ENCOUNTER — HOSPITAL ENCOUNTER (EMERGENCY)
Facility: HOSPITAL | Age: 70
Discharge: HOME | End: 2025-07-29
Attending: EMERGENCY MEDICINE
Payer: MEDICARE

## 2025-07-29 VITALS
OXYGEN SATURATION: 98 % | RESPIRATION RATE: 18 BRPM | TEMPERATURE: 97.9 F | WEIGHT: 183.64 LBS | HEART RATE: 88 BPM | HEIGHT: 71 IN | DIASTOLIC BLOOD PRESSURE: 86 MMHG | BODY MASS INDEX: 25.71 KG/M2 | SYSTOLIC BLOOD PRESSURE: 116 MMHG

## 2025-07-29 DIAGNOSIS — R10.13 ABDOMINAL PAIN, EPIGASTRIC: Primary | ICD-10-CM

## 2025-07-29 LAB
ALBUMIN SERPL-MCNC: 4.3 G/DL (ref 3.5–5.2)
ALBUMIN/GLOB SERPL: 1.7 (ref 1.1–2.2)
ALP SERPL-CCNC: 90 U/L (ref 40–129)
ALT SERPL-CCNC: 40 U/L (ref 10–50)
ANION GAP SERPL CALC-SCNC: 15 MMOL/L (ref 2–14)
APPEARANCE UR: CLEAR
AST SERPL-CCNC: 48 U/L (ref 10–50)
BACTERIA URNS QL MICRO: NEGATIVE /HPF
BASOPHILS # BLD: 0.06 K/UL (ref 0–0.1)
BASOPHILS NFR BLD: 0.7 % (ref 0–1)
BILIRUB SERPL-MCNC: 0.5 MG/DL (ref 0–1.2)
BILIRUB UR QL: NEGATIVE
BUN SERPL-MCNC: 12 MG/DL (ref 8–23)
BUN/CREAT SERPL: 9 (ref 12–20)
CALCIUM SERPL-MCNC: 9.1 MG/DL (ref 8.8–10.2)
CHLORIDE SERPL-SCNC: 106 MMOL/L (ref 98–107)
CO2 SERPL-SCNC: 19 MMOL/L (ref 20–29)
COLOR UR: NORMAL
CREAT SERPL-MCNC: 1.3 MG/DL (ref 0.7–1.2)
DIFFERENTIAL METHOD BLD: ABNORMAL
EOSINOPHIL # BLD: 0.19 K/UL (ref 0–0.4)
EOSINOPHIL NFR BLD: 2.3 % (ref 0–7)
EPITH CASTS URNS QL MICRO: NORMAL /LPF
ERYTHROCYTE [DISTWIDTH] IN BLOOD BY AUTOMATED COUNT: 13.1 % (ref 11.5–14.5)
GLOBULIN SER CALC-MCNC: 2.6 G/DL (ref 2–4)
GLUCOSE SERPL-MCNC: 126 MG/DL (ref 65–100)
GLUCOSE UR STRIP.AUTO-MCNC: NEGATIVE MG/DL
HCT VFR BLD AUTO: 49.6 % (ref 36.6–50.3)
HGB BLD-MCNC: 17.1 G/DL (ref 12.1–17)
HGB UR QL STRIP: NEGATIVE
HYALINE CASTS URNS QL MICRO: NORMAL /LPF (ref 0–2)
IMM GRANULOCYTES # BLD AUTO: 0.02 K/UL (ref 0–0.04)
IMM GRANULOCYTES NFR BLD AUTO: 0.2 % (ref 0–0.5)
KETONES UR QL STRIP.AUTO: NEGATIVE MG/DL
LEUKOCYTE ESTERASE UR QL STRIP.AUTO: NEGATIVE
LIPASE SERPL-CCNC: 38 U/L (ref 13–60)
LYMPHOCYTES # BLD: 2.03 K/UL (ref 0.8–3.5)
LYMPHOCYTES NFR BLD: 25.1 % (ref 12–49)
MCH RBC QN AUTO: 30.3 PG (ref 26–34)
MCHC RBC AUTO-ENTMCNC: 34.5 G/DL (ref 30–36.5)
MCV RBC AUTO: 87.9 FL (ref 80–99)
MONOCYTES # BLD: 0.7 K/UL (ref 0–1)
MONOCYTES NFR BLD: 8.7 % (ref 5–13)
NEUTS SEG # BLD: 5.09 K/UL (ref 1.8–8)
NEUTS SEG NFR BLD: 63 % (ref 32–75)
NITRITE UR QL STRIP.AUTO: NEGATIVE
NRBC # BLD: 0 K/UL (ref 0–0.01)
NRBC BLD-RTO: 0 PER 100 WBC
PH UR STRIP: 6 (ref 5–8)
PLATELET # BLD AUTO: 149 K/UL (ref 150–400)
PMV BLD AUTO: 10.4 FL (ref 8.9–12.9)
POTASSIUM SERPL-SCNC: 3.9 MMOL/L (ref 3.5–5.1)
PROT SERPL-MCNC: 6.9 G/DL (ref 6.4–8.3)
PROT UR STRIP-MCNC: NEGATIVE MG/DL
RBC # BLD AUTO: 5.64 M/UL (ref 4.1–5.7)
RBC #/AREA URNS HPF: NORMAL /HPF (ref 0–5)
SODIUM SERPL-SCNC: 141 MMOL/L (ref 136–145)
SP GR UR REFRACTOMETRY: 1.01 (ref 1–1.03)
TROPONIN T SERPL HS-MCNC: 17.1 NG/L (ref 0–22)
TROPONIN T SERPL HS-MCNC: 19.3 NG/L (ref 0–22)
UROBILINOGEN UR QL STRIP.AUTO: 0.2 EU/DL (ref 0.2–1)
WBC # BLD AUTO: 8.1 K/UL (ref 4.1–11.1)
WBC URNS QL MICRO: NORMAL /HPF (ref 0–4)

## 2025-07-29 PROCEDURE — 74177 CT ABD & PELVIS W/CONTRAST: CPT

## 2025-07-29 PROCEDURE — 96374 THER/PROPH/DIAG INJ IV PUSH: CPT

## 2025-07-29 PROCEDURE — 6360000002 HC RX W HCPCS: Performed by: EMERGENCY MEDICINE

## 2025-07-29 PROCEDURE — 6360000004 HC RX CONTRAST MEDICATION: Performed by: EMERGENCY MEDICINE

## 2025-07-29 PROCEDURE — 99285 EMERGENCY DEPT VISIT HI MDM: CPT

## 2025-07-29 PROCEDURE — 84484 ASSAY OF TROPONIN QUANT: CPT

## 2025-07-29 PROCEDURE — 80053 COMPREHEN METABOLIC PANEL: CPT

## 2025-07-29 PROCEDURE — 81001 URINALYSIS AUTO W/SCOPE: CPT

## 2025-07-29 PROCEDURE — 6370000000 HC RX 637 (ALT 250 FOR IP): Performed by: EMERGENCY MEDICINE

## 2025-07-29 PROCEDURE — 36415 COLL VENOUS BLD VENIPUNCTURE: CPT

## 2025-07-29 PROCEDURE — 83690 ASSAY OF LIPASE: CPT

## 2025-07-29 PROCEDURE — 85025 COMPLETE CBC W/AUTO DIFF WBC: CPT

## 2025-07-29 PROCEDURE — 2580000003 HC RX 258: Performed by: EMERGENCY MEDICINE

## 2025-07-29 RX ORDER — SODIUM CHLORIDE, SODIUM LACTATE, POTASSIUM CHLORIDE, AND CALCIUM CHLORIDE .6; .31; .03; .02 G/100ML; G/100ML; G/100ML; G/100ML
1000 INJECTION, SOLUTION INTRAVENOUS ONCE
Status: COMPLETED | OUTPATIENT
Start: 2025-07-29 | End: 2025-07-29

## 2025-07-29 RX ORDER — DICYCLOMINE HCL 20 MG
20 TABLET ORAL ONCE
Status: COMPLETED | OUTPATIENT
Start: 2025-07-29 | End: 2025-07-29

## 2025-07-29 RX ORDER — SUCRALFATE 1 G/1
1 TABLET ORAL 4 TIMES DAILY
Qty: 60 TABLET | Refills: 0 | Status: SHIPPED | OUTPATIENT
Start: 2025-07-29

## 2025-07-29 RX ORDER — KETOROLAC TROMETHAMINE 15 MG/ML
15 INJECTION, SOLUTION INTRAMUSCULAR; INTRAVENOUS ONCE
Status: COMPLETED | OUTPATIENT
Start: 2025-07-29 | End: 2025-07-29

## 2025-07-29 RX ORDER — HYOSCYAMINE SULFATE 0.12 MG/1
0.12 TABLET SUBLINGUAL EVERY 6 HOURS PRN
Qty: 15 TABLET | Refills: 0 | Status: SHIPPED | OUTPATIENT
Start: 2025-07-29

## 2025-07-29 RX ORDER — IOPAMIDOL 755 MG/ML
100 INJECTION, SOLUTION INTRAVASCULAR
Status: COMPLETED | OUTPATIENT
Start: 2025-07-29 | End: 2025-07-29

## 2025-07-29 RX ADMIN — DICYCLOMINE HYDROCHLORIDE 20 MG: 20 TABLET ORAL at 20:24

## 2025-07-29 RX ADMIN — IOPAMIDOL 100 ML: 755 INJECTION, SOLUTION INTRAVENOUS at 19:53

## 2025-07-29 RX ADMIN — KETOROLAC TROMETHAMINE 15 MG: 15 INJECTION, SOLUTION INTRAMUSCULAR; INTRAVENOUS at 19:03

## 2025-07-29 RX ADMIN — SODIUM CHLORIDE, SODIUM LACTATE, POTASSIUM CHLORIDE, AND CALCIUM CHLORIDE 1000 ML: .6; .31; .03; .02 INJECTION, SOLUTION INTRAVENOUS at 20:24

## 2025-07-29 ASSESSMENT — PAIN DESCRIPTION - LOCATION: LOCATION: ABDOMEN

## 2025-07-29 ASSESSMENT — LIFESTYLE VARIABLES
HOW MANY STANDARD DRINKS CONTAINING ALCOHOL DO YOU HAVE ON A TYPICAL DAY: 1 OR 2
HOW OFTEN DO YOU HAVE A DRINK CONTAINING ALCOHOL: 4 OR MORE TIMES A WEEK

## 2025-07-29 ASSESSMENT — PAIN - FUNCTIONAL ASSESSMENT: PAIN_FUNCTIONAL_ASSESSMENT: 0-10

## 2025-07-29 ASSESSMENT — PAIN SCALES - GENERAL: PAINLEVEL_OUTOF10: 3

## 2025-07-29 ASSESSMENT — PAIN DESCRIPTION - ORIENTATION: ORIENTATION: MID

## 2025-07-29 ASSESSMENT — PAIN DESCRIPTION - DESCRIPTORS: DESCRIPTORS: DULL;ACHING

## 2025-07-29 NOTE — ED NOTES
6:19 PM    Reports worsening intermittent abdominal pain x1w, including \"the skin on my stomach is sore, almost like it's sunburn.\" Nothing makes pain better/ worse. Saw PCP for concern.   No rash present. Denies nausea, vomiting, fevers, chills, diarrhea. Had labs done yesterday, but not fasting. No medications taken for symptoms.     Decreased appetite today. States urine was more clear, now more yellow in color.      I have evaluated the patient as the Provider in Rapid Medical Evaluation (RME). I have reviewed his vital signs and the triage nurse assessment. I have talked with the patient and any available family and advised that I am the provider in triage and have ordered the appropriate study to initiate their work up based on the clinical presentation during my assessment. I have advised that the patient will be accommodated in the Main ED as soon as possible. I have also requested to contact the triage nurse or myself immediately if the patient experiences any changes in their condition during this brief waiting period.  RAJIV Willams NP, Leslie A, APRN - NP  07/29/25 9914

## 2025-07-29 NOTE — ED PROVIDER NOTES
EMERGENCY DEPARTMENT PHYSICIAN NOTE     Patient: Hernan Smith     Time of Service: 7/29/2025  6:26 PM     Chief complaint:   Chief Complaint   Patient presents with    Abdominal Pain        HISTORY:  Patient is a 69 y.o. male who presents to the emergency department with complaints of ***       Past Medical History:   Diagnosis Date    GERD (gastroesophageal reflux disease)     Sleep apnea     mild - CPAP not indicated        Past Surgical History:   Procedure Laterality Date    APPENDECTOMY  1997    ORTHOPEDIC SURGERY  2013    right shoulder    OTHER SURGICAL HISTORY  2016    Inguinal hernia repair        No family history on file.     Social History     Socioeconomic History    Marital status:    Tobacco Use    Smoking status: Never    Smokeless tobacco: Never   Substance and Sexual Activity    Alcohol use: Yes    Drug use: No        Current Medications: Reviewed in chart.    Allergies: No Known Allergies       REVIEW OF SYSTEMS: See HPI for pertinent positives and negatives.      PHYSICAL EXAM:  /86   Pulse 88   Temp 97.9 °F (36.6 °C) (Oral)   Resp 18   Ht 1.803 m (5' 11\")   Wt 83.3 kg (183 lb 10.3 oz)   SpO2 98%   BMI 25.61 kg/m²    Physical Exam      ED Course:           ED physician interpretation of EKG: ***No STEMI.  See my interpretation of EKG in ED course above.    Laboratory Results:  Labs Reviewed   CBC WITH AUTO DIFFERENTIAL   COMPREHENSIVE METABOLIC PANEL   LIPASE   URINALYSIS WITH MICROSCOPIC   EXTRA TUBES HOLD     ED physician interpretation of laboratory results: Documented in ED course    Imaging Results:  No orders to display     ED physician interpretation of imaging: Documented in ED course    Medications Given:  Medications - No data to display    Differential Diagnosis included but not limited to: ***    Medical Decision Making  The patient was placed into an examination in room.    Nursing notes were reviewed.    The patient was interviewed and an examination was

## 2025-07-29 NOTE — ED TRIAGE NOTES
Pt arrives to the ED with c/o abd pain for the past week. States he has internal pain, but also has soreness to the skin on his abd. Denies N/V/D.

## 2025-07-30 NOTE — DISCHARGE INSTRUCTIONS
Routine appointments for health maintenance with a primary care provider are very important and emergency department visits are no substitute.  You should review all findings and test results from your visit today with your primary care physician.        We recommended that you take medications as prescribed.     Please drink a clear liquid diet for 48 hours such as soups and broths, followed by bland diet for 48 hours such as bananas, rice, applesauce, toast, and then return to your normal diet.     Return to the emergency department in 12 to 24 hours or sooner if symptoms appear to be worsening or localize to the right lower quadrant of the abdomen.    Return to the emergency department for any new or concerning signs/symptoms or failure to improve.

## (undated) DEVICE — 3000CC GUARDIAN II: Brand: GUARDIAN

## (undated) DEVICE — SPECIMEN RETRIEVAL POUCH: Brand: ENDO CATCH GOLD

## (undated) DEVICE — DEVICE LCK BILI RAP EXCHG OLPS --

## (undated) DEVICE — TROCAR SITE CLOSURE DEVICE: Brand: ENDO CLOSE

## (undated) DEVICE — SPONGE HEMOSTAT CELLULS 4X8IN -- SURGICEL

## (undated) DEVICE — SURGICAL PROCEDURE KIT GEN LAPAROSCOPY LF

## (undated) DEVICE — DEVICE TRNSF SPIK STL 2008S] MICROTEK MEDICAL INC]

## (undated) DEVICE — TUBING INSUFLTN 10FT LUER -- CONVERT TO ITEM 368568

## (undated) DEVICE — Device

## (undated) DEVICE — MEDI-VAC NON-CONDUCTIVE SUCTION TUBING: Brand: CARDINAL HEALTH

## (undated) DEVICE — BLOCK BITE BLOX W DENTAL RIM --

## (undated) DEVICE — STERILE POLYISOPRENE POWDER-FREE SURGICAL GLOVES: Brand: PROTEXIS

## (undated) DEVICE — 1200 GUARD II KIT W/5MM TUBE W/O VAC TUBE: Brand: GUARDIAN

## (undated) DEVICE — BLADELESS OPTICAL TROCAR WITH FIXATION CANNULA: Brand: VERSAPORT

## (undated) DEVICE — REM POLYHESIVE ADULT PATIENT RETURN ELECTRODE: Brand: VALLEYLAB

## (undated) DEVICE — KENDALL SCD EXPRESS SLEEVES, KNEE LENGTH, MEDIUM: Brand: KENDALL SCD

## (undated) DEVICE — DEVON™ KNEE AND BODY STRAP 60" X 3" (1.5 M X 7.6 CM): Brand: DEVON

## (undated) DEVICE — BLADELESS OPTICAL TROCAR WITH FIXATION CANNULA: Brand: VERSAONE

## (undated) DEVICE — DRAPE,REIN 53X77,STERILE: Brand: MEDLINE

## (undated) DEVICE — ADULT SPO2 SENSOR: Brand: NELLCOR

## (undated) DEVICE — E-Z CLEAN, PTFE COATED, ELECTROSURGICAL LAPAROSCOPIC ELECTRODE, L-HOOK, 33 CM., SINGLE-USE, FOR USE WITH HAND CONTROL PENCIL: Brand: MEGADYNE

## (undated) DEVICE — CLICKLINE SCISSORS INSERT: Brand: CLICKLINE

## (undated) DEVICE — POSITIONER HD REST SUPINE LAT

## (undated) DEVICE — NEEDLE HYPO 22GA L1.5IN BLK S STL HUB POLYPR SHLD REG BVL

## (undated) DEVICE — RETRIEVAL BALLOON CATHETER: Brand: EXTRACTOR™ PRO RX

## (undated) DEVICE — DERMABOND SKIN ADH 0.7ML -- DERMABOND ADVANCED 12/BX

## (undated) DEVICE — INFECTION CONTROL KIT SYS

## (undated) DEVICE — SPHINCTEROTOME: Brand: DREAMTOME™ RX 44

## (undated) DEVICE — (D)PREP SKN CHLRAPRP APPL 26ML -- CONVERT TO ITEM 371833

## (undated) DEVICE — SUTURE SZ 0 27IN 5/8 CIR UR-6  TAPER PT VIOLET ABSRB VICRYL J603H

## (undated) DEVICE — Device: Brand: ENDO SMARTCAP

## (undated) DEVICE — APPLIER LIG CLP 5MM CONTAIN 16 TI CLP DISP ENDO CLP

## (undated) DEVICE — SOL IRRIGATION INJ NACL 0.9% 500ML BTL

## (undated) DEVICE — UNIVERSAL FIXATION CANNULA: Brand: VERSAONE

## (undated) DEVICE — CUFF RMFG BP INF SZ 11 DISP -- LAWSON OEM ITEM 238915

## (undated) DEVICE — SYRINGE MED 20ML STD CLR PLAS LUERLOCK TIP N CTRL DISP

## (undated) DEVICE — COVER LT HNDL BLU PLAS